# Patient Record
Sex: MALE | Race: WHITE | Employment: OTHER | URBAN - METROPOLITAN AREA
[De-identification: names, ages, dates, MRNs, and addresses within clinical notes are randomized per-mention and may not be internally consistent; named-entity substitution may affect disease eponyms.]

---

## 2022-01-27 ENCOUNTER — OFFICE VISIT (OUTPATIENT)
Dept: ENT CLINIC | Age: 72
End: 2022-01-27
Payer: MEDICARE

## 2022-01-27 ENCOUNTER — PROCEDURE VISIT (OUTPATIENT)
Dept: AUDIOLOGY | Age: 72
End: 2022-01-27
Payer: MEDICARE

## 2022-01-27 VITALS
OXYGEN SATURATION: 94 % | TEMPERATURE: 97.3 F | HEART RATE: 67 BPM | SYSTOLIC BLOOD PRESSURE: 148 MMHG | DIASTOLIC BLOOD PRESSURE: 81 MMHG

## 2022-01-27 DIAGNOSIS — H93.13 SUBJECTIVE TINNITUS OF BOTH EARS: Chronic | ICD-10-CM

## 2022-01-27 DIAGNOSIS — R42 DIZZINESS: Primary | Chronic | ICD-10-CM

## 2022-01-27 DIAGNOSIS — H90.A31 MIXED CONDUCTIVE AND SENSORINEURAL HEARING LOSS OF RIGHT EAR WITH RESTRICTED HEARING OF LEFT EAR: ICD-10-CM

## 2022-01-27 DIAGNOSIS — H90.3 SENSORINEURAL HEARING LOSS (SNHL) OF BOTH EARS: Chronic | ICD-10-CM

## 2022-01-27 DIAGNOSIS — H93.A1 PULSATILE TINNITUS OF RIGHT EAR: Primary | ICD-10-CM

## 2022-01-27 PROCEDURE — 92567 TYMPANOMETRY: CPT | Performed by: AUDIOLOGIST

## 2022-01-27 PROCEDURE — 99203 OFFICE O/P NEW LOW 30 MIN: CPT | Performed by: OTOLARYNGOLOGY

## 2022-01-27 PROCEDURE — 92557 COMPREHENSIVE HEARING TEST: CPT | Performed by: AUDIOLOGIST

## 2022-01-27 ASSESSMENT — ENCOUNTER SYMPTOMS
SORE THROAT: 0
RHINORRHEA: 0
SINUS PAIN: 0

## 2022-01-27 NOTE — PROGRESS NOTES
[facility]       NEW PATIENT VISIT      PCP:  Hima Gonzalez      REFERRED BY:   self      CHIEF COMPLAINT  Chief Complaint   Patient presents with    Dizziness       HISTORY OF PRESENT ILLNESS       Celeste Schwarz is a 66 y.o. male here for evaluation and treatment of dizziness, for one year, and which is intermittent, comes and goes. Patient denied any antecedent illness or ear or head trauma. Patient denied any modifying factors. Associated symptoms include nausea, cold sweat a couple times when really severe spinning. \"When I lay down, my head starts spinning not all the time but most of the time. Comes on with fast movements. \"  Dizziness was described as a sensation of head spinning, out of balance. Dizziness is precipitated \"if I make a sudden movement. Get up too quick. \"  No other precipitating factors have been noted. There is no change in hearing or onset of or change in tinnitus during dizziness. Tinnitus, ringing, about 25 years. REVIEW OF SYSTEMS   Review of Systems   Constitutional: Negative for chills, fever and unexpected weight change. HENT: Positive for tinnitus. Negative for congestion, ear discharge, ear pain, hearing loss, postnasal drip, rhinorrhea, sinus pain and sore throat. PAST MEDICAL HISTORY    Past Medical History:   Diagnosis Date    Atrial fibrillation ([address]     Gout     Hypertension        Past Surgical History:   Procedure Laterality Date    AMPUTATION      left hand little finger partial amputation    CARDIAC SURGERY      valve replacement    CATARACT REMOVAL WITH IMPLANT      bilat    EYE SURGERY           EXAMINATION    Vitals:    01/27/22 0924   BP: (!) 148/81   Site: Left Upper Arm   Position: Sitting   Cuff Size: Large Adult   Pulse: 67   Temp: 97.3 °F (36.3 °C)   TempSrc: Temporal   SpO2: 94%       Physical Exam  Vitals reviewed.    Constitutional:       General: He is awake. He is not in acute distress. Appearance: Normal appearance. He is well-developed. He is not ill-appearing or toxic-appearing. HENT:      Head: Normocephalic and atraumatic. Salivary Glands: Right salivary gland is not diffusely enlarged or tender. Left salivary gland is not diffusely enlarged or tender. Right Ear: Tympanic membrane, ear canal and external ear normal.      Left Ear: Tympanic membrane, ear canal and external ear normal.      Ears:      Comments: Bilateral otomicroscopy performed. Nose: Nose normal. No nasal deformity, septal deviation, mucosal edema, congestion or rhinorrhea. Right Turbinates: Not enlarged. Left Turbinates: Not enlarged. Right Sinus: No maxillary sinus tenderness or frontal sinus tenderness. Left Sinus: No maxillary sinus tenderness or frontal sinus tenderness. Mouth/Throat:      Lips: Pink. No lesions. Mouth: Mucous membranes are moist. No oral lesions. Tongue: No lesions. Palate: No mass and lesions. Pharynx: Oropharynx is clear. Uvula midline. No oropharyngeal exudate or posterior oropharyngeal erythema. Tonsils: No tonsillar exudate or tonsillar abscesses. Neck:      Thyroid: No thyroid mass, thyromegaly or thyroid tenderness. Trachea: No tracheal deviation. Comments: No cervical masses or tenderness. Musculoskeletal:      Cervical back: Normal range of motion and neck supple. Lymphadenopathy:      Cervical: No cervical adenopathy. Neurological:      Mental Status: He is alert. Cranial Nerves: Cranial nerves are intact. No cranial nerve deficit, dysarthria or facial asymmetry. Coordination: Finger-Nose-Finger Test normal. Rapid alternating movements normal.           AUDIOGRAM         IMPRESSION / DIAGNOSES / April Ivy was seen today for dizziness.     Diagnoses and all orders for this visit:    [facility], Angela Rodriguez    Subjective tinnitus of both ears    Sensorineural hearing loss (SNHL) of both ears         RECOMMENDATIONS/PLAN      Return for recheck after VNG/ENG testing.

## 2022-03-04 ENCOUNTER — PROCEDURE VISIT (OUTPATIENT)
Dept: AUDIOLOGY | Age: 72
End: 2022-03-04
Payer: MEDICARE

## 2022-03-04 DIAGNOSIS — R42 DIZZINESS: Primary | ICD-10-CM

## 2022-03-04 PROCEDURE — 92537 CALORIC VSTBLR TEST W/REC: CPT | Performed by: AUDIOLOGIST

## 2022-03-04 PROCEDURE — 92540 BASIC VESTIBULAR EVALUATION: CPT | Performed by: AUDIOLOGIST

## 2022-03-30 ENCOUNTER — OFFICE VISIT (OUTPATIENT)
Dept: ENT CLINIC | Age: 72
End: 2022-03-30
Payer: MEDICARE

## 2022-03-30 VITALS
HEART RATE: 71 BPM | TEMPERATURE: 97.1 F | DIASTOLIC BLOOD PRESSURE: 67 MMHG | OXYGEN SATURATION: 93 % | SYSTOLIC BLOOD PRESSURE: 127 MMHG

## 2022-03-30 DIAGNOSIS — R42 DIZZINESS: Primary | Chronic | ICD-10-CM

## 2022-03-30 DIAGNOSIS — R94.113 ABNORMAL ENG (ELECTRONYSTAGMOGRAM): ICD-10-CM

## 2022-03-30 DIAGNOSIS — H93.13 SUBJECTIVE TINNITUS OF BOTH EARS: Chronic | ICD-10-CM

## 2022-03-30 DIAGNOSIS — H90.3 SENSORINEURAL HEARING LOSS (SNHL) OF BOTH EARS: Chronic | ICD-10-CM

## 2022-03-30 PROCEDURE — 99213 OFFICE O/P EST LOW 20 MIN: CPT | Performed by: OTOLARYNGOLOGY

## 2022-03-30 NOTE — PATIENT INSTRUCTIONS
· I recommend an MRI scan of the IACs/brain, with contrast, to rule out a vestibular schwannoma (\"acoustic neuroma\") or central nervous system disease as the reason for your dizziness, abnormal ENG or VNG, unilateral or asymmetric sensorineural hearing loss, or tinnitus. There are adverse consequences of untreated acoustic neuroma, i.e. total loss of hearing, paralysis of the face, permanent dizziness or pressure on the brain. This may occur suddenly due to bleeding into the tumor or sudden growth. Call the office for the results of the MRI scan 10 days after the test, if you have not been informed previously. · Avoid working or being at heights, on ladders or scaffolding or near the edges of platforms or using heavy or complicated machinery or operating a motorized vehicle if you are experiencing dizziness and until having been dizzy free for 72 hours. Even then, take appropriate care and precautions as dizziness could occur at any time. · Avoid any motions or activity that results in the off balance sensation. Stand up or arise slowly and in stages, as we discussed. · You should consider amplification therapy, hearing aid, if you are having difficulty communicating and/or hearing important sounds. · You should return for an annual hearing test to monitor your hearing, and whenever your hearing further decreases significantly. · You should return if your ears plug up with ear wax causing difficulty hearing or pressure. · You should employ the tinnitus masking techniques and strategies we discussed, as needed. Bedside tinnitus masking devices (eg. a white noise machine, noise machine, noise jeremy on your cell phone, fan on in the room, radio with light music or tuned between stations to white noise static) can be very helpful.   · You should avoid exposure to excessively high levels of noise/sound and use hearing protection measures we discussed, as needed, if such exposure is unavoidable. · Proceed with vestibular physical therapy. · NO Q-TIPS IN THE EARS  You should never clean your ears with a Q-tip, cotton tipped applicator, Leonila pin, paper clip, pen cap, nail file, or any other instrument. I recommend only use of one the several ear wax removal kits available \"over the counter\" if you feel a need to try to remove ear wax. No other methods should be self used for this purpose as there is danger of injury to the ear and risk of irreparable and irreversible permanent hearing loss.

## 2022-03-30 NOTE — PROGRESS NOTES
[facility]       CHIEF COMPLAINT  Chief Complaint   Patient presents with    Dizziness       HISTORY OF PRESENT ILLNESS       Nori Ding is a 66 y.o. male here for recheck and follow up of dizziness. Gets a mild headache after episodes of dizziness. VNG  (see full report for details)     I independently assessed and evaluated the results of the audiogram, a test performed by another healthcare provider. This shows findings consistent with central nervous system etiology of vestibular dysfunction and consistent with benign paroxysmal positional vertigo. IMPRESSIONS:       Abnormal VNG.   All central subtests were abnormal, suggestive of central findings. Should be noted, patient does have 20/40 vision of which could be a contributor to abnormal central findings. Positional testing revealed low velocity left-beating nystagmus and down-beating nystagmus for body right position only. There was evidence of left beating nystagmus during first trial and up-beating nystagmus during both trials of right [employer]. This finding of up-beating nystagmus is clinically significant. Caloric irrigations were normal for unilateral weakness . Of the four irrigations, left beating nystagmus was 24% stronger than right beating nystagmus. This value for Directional Preponderance demonstrates borderline clinical significance. COUNSELING    The audiogram results were reviewed again and discussed with the patient, whom I advised of the type and severity of hearing loss, the probable etiology, possible associated impairment and disability, need for noise protection and avoidance, possible benefits of hearing aids, or other amplification therapy. I discussed the etiology of tinnitus and treatment/coping measures including masking techniques, and need for annual and prn hearing tests.   Patient was advised of difficulty understanding speech in the presence of moderate to high volume background noise. The VNG  results were reviewed and discussed with the patient, whom I advised of the significance of the findings. I discussed the functions of the vestibular system and the probable etiology and diagnosis, vestibular dysfunction and impairment and possible disability. I advised that I cannot rule out intermittent vestibular dysfunction. I discussed vestibular exercises, and possible benefits of vestibular rehabilitation therapy. I discussed activity and safety precautions, fall prevention and avoidance, and need for continued follow-up. I discussed possible etiologies of dizziness including, but not limited to: Meniere's disease, vestibular neuronitis, acute labyrinthitis, idiopathic peripheral vestibular dysfunction, benign paroxysmal positional vertigo, autoimmune hearing loss and vestibular dysfunction, multiple sclerosis, vestibular schwannoma (acoustic neuroma), CNS neoplasm, and other CNS disease. I discussed vestibular schwannoma (acoustic neuroma), signs, symptoms, possible progression with loss of hearing, dizziness, facial nerve paralysis and compression of brain stem, and thus the need for an MRI scan of the IACs/brain to investigate for this entity. Shalom Mathews / Eliz Saunders / Florentin Ramires / Patrick Chun was seen today for dizziness.     Diagnoses and all orders for this visit:    Dizziness  Comments:  probable BPPV, possible central etiology, possible contribution due to vision problem  Orders:  -     MRI IAC [facility] [address] [facility]    Abnormal ENG (electronystagmogram)  -     MRI IAC [facility]ER[facility] WO CONTRAST    Subjective tinnitus of both ears    Sensorineural hearing loss (SNHL) of both ears           RECOMMENDATIONS / PLAN     Patient Instructions     · I recommend an MRI scan of the IACs/brain, with contrast, to rule out a vestibular schwannoma (\"acoustic neuroma\") or central nervous system disease as the reason for your dizziness, abnormal ENG or VNG, unilateral or asymmetric sensorineural hearing loss, or tinnitus. There are adverse consequences of untreated acoustic neuroma, i.e. total loss of hearing, paralysis of the face, permanent dizziness or pressure on the brain. This may occur suddenly due to bleeding into the tumor or sudden growth. Call the office for the results of the MRI scan 10 days after the test, if you have not been informed previously. · Avoid working or being at heights, on ladders or scaffolding or near the edges of platforms or using heavy or complicated machinery or operating a motorized vehicle if you are experiencing dizziness and until having been dizzy free for 72 hours. Even then, take appropriate care and precautions as dizziness could occur at any time. · Avoid any motions or activity that results in the off balance sensation. Stand up or arise slowly and in stages, as we discussed. · You should consider amplification therapy, hearing aid, if you are having difficulty communicating and/or hearing important sounds. · You should return for an annual hearing test to monitor your hearing, and whenever your hearing further decreases significantly. · You should return if your ears plug up with ear wax causing difficulty hearing or pressure. · You should employ the tinnitus masking techniques and strategies we discussed, as needed. Bedside tinnitus masking devices (eg. a white noise machine, noise machine, noise jeremy on your cell phone, fan on in the room, radio with light music or tuned between stations to white noise static) can be very helpful. · You should avoid exposure to excessively high levels of noise/sound and use hearing protection measures we discussed, as needed, if such exposure is unavoidable. · Proceed with vestibular physical therapy.   · NO Q-TIPS IN THE EARS  You should

## 2022-04-11 ENCOUNTER — HOSPITAL ENCOUNTER (OUTPATIENT)
Dept: MRI IMAGING | Age: 72
Discharge: HOME | End: 2022-04-11
Payer: MEDICARE

## 2022-04-11 DIAGNOSIS — R42 DIZZINESS: Chronic | ICD-10-CM

## 2022-04-11 DIAGNOSIS — R94.113 ABNORMAL ENG (ELECTRONYSTAGMOGRAM): ICD-10-CM

## 2022-04-11 LAB
CREAT SERPL-MCNC: 1.7 MG/DL (ref 0.8–1.3)
GFR AFRICAN AMERICAN: 48
GFR NON-AFRICAN AMERICAN: 40

## 2022-04-11 PROCEDURE — 70553 MRI BRAIN STEM W/O & W/DYE: CPT

## 2022-04-11 PROCEDURE — A9577 INJ MULTIHANCE: HCPCS | Performed by: OTOLARYNGOLOGY

## 2022-04-11 PROCEDURE — 82565 ASSAY OF CREATININE: CPT

## 2022-04-11 PROCEDURE — 36415 COLL VENOUS BLD VENIPUNCTURE: CPT

## 2022-04-11 PROCEDURE — 6360000004 HC RX CONTRAST MEDICATION: Performed by: OTOLARYNGOLOGY

## 2022-04-11 PROCEDURE — 2580000003 HC RX 258: Performed by: OTOLARYNGOLOGY

## 2022-04-11 RX ORDER — SODIUM CHLORIDE 0.9 % (FLUSH) 0.9 %
10 SYRINGE (ML) INJECTION ONCE
Status: COMPLETED | OUTPATIENT
Start: 2022-04-11 | End: 2022-04-11

## 2022-04-11 RX ADMIN — Medication 10 ML: at 11:16

## 2022-04-11 RX ADMIN — GADOBENATE DIMEGLUMINE 16 ML: 529 INJECTION, SOLUTION INTRAVENOUS at 11:15

## 2022-04-21 ENCOUNTER — HOSPITAL ENCOUNTER (OUTPATIENT)
Dept: PHYSICAL THERAPY | Age: 72
Setting detail: THERAPIES SERIES
Discharge: HOME | End: 2022-04-21
Payer: MEDICARE

## 2022-04-21 PROCEDURE — 97162 PT EVAL MOD COMPLEX 30 MIN: CPT

## 2022-04-21 NOTE — PLAN OF CARE
[id number]  [address] [facility], [address]  Phone: [phone]   Fax: [phone]     Physical Therapy Certification    Dear Referring Provider (secondary): Jewel Galvan MD,    We had the pleasure of evaluating the following patient for physical therapy services at [address] [place] [address]. A summary of our findings can be found in the initial assessment below. This includes our plan of care. If you have any questions or concerns regarding these findings, please do not hesitate to contact me at the office phone number checked above. Thank you for the referral.       Physician Signature:_______________________________Date:__________________  By signing above (or electronic signature), therapist's plan is approved by physician      Patient: Sol Telles   : 1950   MRN: [id number]  Referring Physician:  Jewel Galvan MD       Evaluation Date: 2022      Medical Diagnosis Information:  Diagnosis: R42 (ICD-10-CM) - Dizziness   Treatment Diagnosis: BPPV, righ ear unilateral vestibular hypofunction, & imbalance                                         Insurance information: PT Insurance Information: [employer] essential [employer]MO     Precautions/ Contra-indications:   Latex Allergy:  [x]NO      []YES  Preferred Language for Healthcare:   [x]English       []Other:    C-SSRS Triggered by Intake questionnaire (Past 2 wk assessment ):   [x] No, Questionnaire did not trigger screening.   [] Yes, Patient intake triggered C-SSRS Screening     [] Completed, no further action required. [] Completed, PCP notified via Epic    SUBJECTIVE: Patient stated chief complaint:  Patient reports that he has been having dizziness/ spinning for about a year when lying down and getting out of bed. States at times he had fogginess with rapid head movement. Comments also that he has trouble with his vision trying track moving objects.  His goal is to Numbness/Tingling:  none    Gait: (include devices/WB status)  Wide base gait pattern , slow albert     Cervical AROM    Cervical Flexion WFL all  No symptoms    Cervical Extension     Cervical SB L R    Cervical rotation L R    Upper Extremity PROM AROM         L R L R   Shoulder Flexion  WFL all WFL all WFLall WFL all   Shoulder Abduction        Shoulder External Rotation        Shoulder Internal Rotation        Elbow Flexion        Elbow Extension        Oculomotor/Vestibular Examination & Special Tests:     Coordination:  Rapid alternating movements: [] Normal [] Dysdiadochokinesia   Finger to Nose:   [] Normal [] Dysmetric  Heel to Shin:    [x] Normal [] Ataxic    Balance & Postural Control Tests:  Clinical Test of Sensory Interaction for Balance (CTSIB) performed in Romberg stance  CONDITION TIME STRATEGY SWAY    Eyes open, firm surface 15 None None     Eyes closed, firm surface 15 Ankle  Lateral     Eyes open, foam surface       Eyes closed, foam surface        Balance:  CONDITION TIME STRATEGY SWAY   Narrowed CORIN 15 None  None    Tandem R 15 Ankle  Right lateral sway    Tandem L 15 Ankle  Lateral    SLS L 3 Step  Lateral - multiple attempts    SLS R 10 Step  Lateral      Oculomotor/Vestibular Examination     Spontaneous nystagmus:  [] Left  [] Right [x] Absent    Gaze-Evoked nystagmus with fixation present:   Primary [] Present [x] Absent   Right  [] Present [x] Absent   Left  [] Present [x] Absent    Smooth Pursuit (H):  [x] Normal [] Abnormal Comments:    Smooth Pursuit (V):  [x] Normal [] Abnormal Comments:     Saccades (H):  [x] Normal [] Abnormal Comments:  Slow albert     Saccades (V):  [x] Normal [] Abnormal Comments: slow albert     Convergence:  [x] Normal [] Abnormal Comments:     Head Thrust Test:  [] Normal [x] Abnormal  Comments:  Hypofunction to left     VOR Cancellation (H): [x] Normal [] Abnormal Comments:    VOR Cancellation (V): [x] Normal [] Abnormal Comments:     VOR (V):   [x] Normal [] Abnormal Comments: not as bad     VOR (H):   [x] Normal [] Abnormal Comments: fogginess, \"off feeling\"         Positional Testing:  R [place] maneuver:    Nystagmus:  [x] Yes  [] No  [] Duration:2-3 seconds        [x] Direction: upbeat torsion     Vertigo:  [] Yes  [] No  [] Duration:     L [place] maneuver: NT   Nystagmus:  [] Yes  [] No  [] Duration:       [] Direction:    Vertigo:  [] Yes  [] No  [] Duration:     Supine roll head right: NT  To be test later    Nystagmus:  [] Yes  [] No  [] Duration:       [] Direction:    Vertigo:  [] Yes  [] No  [] Duration:     Supine roll head left: NT   Nystagmus:  [] Yes  [] No  [] Duration:       [] Direction:    Vertigo:  [] Yes  [] No  [] Duration:       [x] Patient history, allergies, meds reviewed. Medical chart reviewed. See intake form. Review of Systems (ROS):  [x]Performed Review of systems (Integumentary, Cardiopulmonary, Neurological) by intake and observation. Intake form has been scanned into medical record. Patient has been instructed to contact their primary care physician regarding ROS issues if not already being addressed at this time.       Co-morbidities/Complexities (which will affect course of rehabilitation):   []None           Arthritic conditions   []Rheumatoid arthritis (M05.9)  []Osteoarthritis (M19.91)   Cardiovascular conditions   [x]Hypertension (I10)  []Hyperlipidemia (E78.5)  []Angina pectoris (I20)  []Atherosclerosis (I70)   Musculoskeletal conditions   []Disc pathology   []Congenital spine pathologies   []Prior surgical intervention  []Osteoporosis (M81.8)  []Osteopenia (M85.8)   Endocrine conditions   []Hypothyroid (E03.9)  []Hyperthyroid Gastrointestinal conditions   []Constipation (V28.07)   Metabolic conditions   []Morbid obesity (E66.01)  []Diabetes type 1(E10.65) or 2 (E11.65)   []Neuropathy (G60.9)     Pulmonary conditions   []Asthma (J45)  []Coughing   []COPD (J44.9)   Psychological Disorders  []Anxiety (F41.9)  []Depression (F32.9)   []Other:   [x]Other:   Cardiac valve replacement         Barriers to/and or personal factors that will affect rehab potential:              []Age  []Sex    []Smoker              []Motivation/Lack of Motivation                        [x]Co-Morbidities              []Cognitive Function, education/learning barriers              []Environmental, home barriers              []profession/work barriers  []past PT/medical experience  []other:  Justification:HTN, A-fib    Falls Risk Assessment (30 days):   [x] Falls Risk assessed; no intervention required. [] Falls Risk assessed; Patient requires intervention due to being higher risk   TUG score (>12s at risk):     [] Falls education provided, including       ASSESSMENT: The patient is a 67 yo male who presents with right ear canalith with upbeat torsion nystagmus & dizziness. He demonstrated Imbalance with narrow base support . He has had  Persistent that has gone on for up to year with lying down and getting up. The patient to benefit from skilled PT services to address vestibular hypofunction and restore normal canalith for the optimal strength of his vestibular system.      Functional Impairments:  Problem List/Functional Limitations:   [x] BPPV    [x] Right [x] Posterior Canal [x] Canalithiasis    [] Left  [] Horizontal Canal [] Cupulolithiasis   [] Decreased Gaze Stabilization    [x] Increased Motion Sensitivity   [x] Unilateral Vestibular Hypofunction [] Bilateral Vestibular Hypofunction   [] Gait Instability    [] Decreased tolerance for ADLs    [] Decreased functional strength   [x] Reduced Balance/Proprioceptive control   [] Reduced ability to hear/focus   [] Noted cervical/thoracic/GHJ joint hypomobility   [] Noted cervical/thoracic/GHJ joint hypermobility   [] Decreased cervical/UE functional ROM   [] Noted Headache pain aggravated by neck movements with/without dizziness   [] Abnormal reflexes/sensation/myotomal/dermatomal deficits   [] Decreased DCF control or ability to hold head up   [] Decreased RC/scapular/core strength and neuromuscular control     Functional Activity Limitations (from functional questionnaire and intake)   []Reduced ability to tolerate prolonged functional positions   []Reduced ability or difficulty with changes of positions or transfers between positions  []Reduced ability to transfer in/out of bed or rolling in bed   []Reduced ability or tolerance with driving, reading and/or computer work   []Reduced ability to perform lifting, reaching, carrying tasks   []Reduced ability to forward bend   [x]Reduced ability to ambulate prolonged functional periods/distances/surfaces   []Reduced ability to ascend/descend stairs  []Reduced ability to concentrate/focus  [x]Reduced ability to turn/pitch head rapidly  []Reduced ability to self-correct for losses of balance []other:       Participation Restrictions   [x]Reduced participation in self-care activities   []Reduced participation in home management activities   []Reduced participation in work activities   []Reduced participation in social activities   []Reduced participation in sport/recreational activities    Classification:   [x]Signs/symptoms consistent with BPPV (benign paroxysmal positional vertigo)      []Signs/symptoms consistent with unilateral peripheral vestibulopathy (i.e., vestibular neuritis, labyrinthitis, acoustic neuroma)   [x]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  []Signs/symptoms consistent with migraine-related vestibulopathy  []Signs/symptoms consistent with Meniere's disease / post-traumatic hydrops  []Signs/symptoms consistent with perilymphatic fistula   []Signs/symptoms consistent with cervicogenic dizziness  []Signs/symptoms consistent with gait instability   []Signs/symptoms consistent with motion sensitivity    []signs/symptoms consistent with neck pain with mobility deficits     []signs/symptoms consistent with neck pain with movement coordinated impairments    []signs/symptoms consistent with neck pain with radiating pain    []signs/symptoms consistent with neck pain with headaches (cervicogenic)    []Signs/symptoms consistent with nerve root involvement including myotome & dermatome dysfunction   []sign/symptoms consistent with facet dysfunction of cervical and thoracic spine    []signs/symptoms consistent suggesting central cord compression/UMN syndromes   []signs/symptoms consistent with discogenic cervical pain   []signs/symptoms consistent with rib dysfunction   []signs/symptoms consistent with postural dysfunction   []signs/symptoms consistent with shoulder pathology    []signs/symptoms consistent with post-surgical status including decreased ROM, strength and function.    []signs/symptoms consistent with pathology which may benefit from Dry Needling  []signs/symptoms which may limit the use of advanced manual therapy techniques: (Elevated CV risk profile, recent trauma, intolerance to end range positions, prior TIA, visual issues, UE neurological compromise)   []other:      Prognosis/Rehab Potential:      [x]Excellent   []Good    []Fair   []Poor    Tolerance of evaluation/treatment:    [x]Excellent   []Good    []Fair   []Poor     Physical Therapy Evaluation Complexity Justification  [x] A history of present problem with:  [] no personal factors and/or comorbidities that impact the plan of care;  [x]1-2 personal factors and/or comorbidities that impact the plan of care  []3 personal factors and/or comorbidities that impact the plan of care  [x] An examination of body systems using standardized tests and measures addressing any of the following: body structures and functions (impairments), activity limitations, and/or participation restrictions;:  [x] a total of 1-2 or more elements   [] a total of 3 or more elements   [] a total of 4 or more elements   [x] A clinical presentation with:  [x] stable and/or uncomplicated characteristics   [] evolving clinical presentation with changing characteristics  [] unstable and unpredictable characteristics;   [x] Clinical decision making of [] low , [x] moderate, [] high complexity using standardized patient assessment instrument and/or measurable assessment of functional outcome. [] EVAL (LOW) 70876 (typically 15 minutes face-to-face)  [x] EVAL (MOD) 69164 (typically 30 minutes face-to-face)  [] EVAL (HIGH) 84147 (typically 45 minutes face-to-face)  [] RE-EVAL     PLAN:   Today's Treatment:    [x] See flowsheet   [x] Patient treated with canalith repositioning maneuver   [x] Education materials provided on BPPV/Vestibular Dysfunction/Habituation   [] Precautions provided and patient to follow precautions for next 24 hours in regards to BPPV management   [x] Written home exercise instructions   [] Other:    Frequency/Duration:  2 days per week for 4 Weeks:  Interventions:  [x]  Therapeutic exercise including: strength training, ROM, for LEs, cervical spine & UEs  [x]  NMR activation and proprioception for BLEs, vestibular training/habituation, balance, coordination  [x]  Manual therapy as indicated via: canalith repositioning maneuvers, Dry Needling/IASTM, STM, PROM, Gr I-IV mobilizations, manipulation. [x]  Modalities as needed that may include: thermal agents, E-stim, Biofeedback, US, iontophoresis as indicated  [x]  Gait training  [x]  Patient education on BPPV/vestibular function, balance, postural re-education, activity modification, progression of HEP. HEP instruction: Written HEP instructions provided and reviewed. GOALS:  Patient stated goal: to improve dizziness   [] Progressing: [] Met: [] Not Met: [] Adjusted    Therapist goals for Patient:   Short Term Goals: To be achieved in: 2 weeks  1. Independent in HEP and progression per patient tolerance, in order to prevent re-injury. [] Progressing: [] Met: [] Not Met: [] Adjusted  2.  Patient will have a decrease in dizziness/imbalance/symptoms with normal ADLs to facilitate improvement in movement, function, balance and ADLs as indicated by Functional Deficits. [] Progressing: [] Met: [] Not Met: [] Adjusted    Long Term Goals: To be achieved in: 4 weeks  1. FOTO score of 60  to assist with reaching prior level of function. [] Progressing: [] Met: [] Not Met: [] Adjusted    2. Patient will demonstrate negative oculomotor special testing/positional testing as indicated by patients Functional Deficits. [] Progressing: [] Met: [] Not Met: [] Adjusted  3. Patient will return to functional activities including lying down in bed without increased symptoms or restriction. [] Progressing: [] Met: [] Not Met: [] Adjusted  4. Patient to be able to make rapid head movements without dizziness/apprehension.    [] Progressing: [] Met: [] Not Met: [] Adjusted     Electronically signed by:  Akin Francois PT

## 2022-04-21 NOTE — FLOWSHEET NOTE
[facility]  Phone: [phone]   Fax: [phone]    Physical Therapy Daily Treatment Note  Date:  2022    Patient Name:  Iesha Taveras    :  1950  MRN: [id number]  Medical/Treatment Diagnosis Information:  · Diagnosis: R42 (ICD-10-CM) - Dizziness  · Treatment Diagnosis: BPPV, righ ear unilateral vestibular hypofunction, & imbalance  Insurance/Certification information:  PT Insurance Information: [place] mediblue essential HMO  Physician Information:  Mitchell Dutton MD   Plan of care signed (Y/N): []  Yes [x]  No     Date of Patient follow up with Physician:  TBPONCHO     Progress Report: []  Yes  [x]  No     Date Range for reporting period:  Beginning:   Ending:     Progress report due (10 Rx/or 30 days whichever is less): visit #27 or   Recertification due (POC duration/ or 90 days whichever is less): visit #20    Visit # Insurance Allowable Auth required?  Date Range   eval Waiting approval  [x]  Yes  []  No Waiting approval       Units approved Units used Date Range            Latex Allergy:  [x]NO      []YES  Preferred Language for Healthcare:   [x]English       []other:    Functional Scale:           Date assessed:  FOTO physical FS primary measure score = 56; risk adjusted = 53  2022    Pain level:  0/10     SUBJECTIVE:  See eval    OBJECTIVE: See eval      RESTRICTIONS/PRECAUTIONS:   Cardiac valve replacement, HTN    Exercises/Interventions:     Therapeutic Exercises (89502) Resistance / level Sets/sec Reps Notes                                                                  Therapeutic Activities (54028)                                          Neuromuscular Re-ed (06753)       silver roberts (+) R  R CRT performed x 2      upbeat torsional nystagmus 2 sec x1 CRT, 2 CRT no nystagmus    VOR        Saccade   Smooth Pursuit       Turns/twist       Quarter turns               Manual Intervention ([address] [facility]) Modalities:     Pt. Education:  -patient educated on diagnosis, prognosis and expectations for rehab  -all patient questions were answered    Home Exercise Program:  4/21/2022: VOR handout [facility]       Therapeutic Exercise and NMR EXR  [] (05130) Provided verbal/tactile cueing for activities related to strengthening, flexibility, endurance, ROM for improvements in  [] LE / Lumbar: LE, proximal hip, and core control with self care, mobility, lifting, ambulation. [] UE / Cervical: cervical, postural, scapular, scapulothoracic and UE control with self care, reaching, carrying, lifting, house/yardwork, driving, computer work.  [] (74220) Provided verbal/tactile cueing for activities related to improving balance, coordination, kinesthetic sense, posture, motor skill, proprioception to assist with   [] LE / lumbar: LE, proximal hip, and core control in self care, mobility, lifting, ambulation and eccentric single leg control. [] UE / cervical: cervical, scapular, scapulothoracic and UE control with self care, reaching, carrying, lifting, house/yardwork, driving, computer work.   [] (42793) Therapist is in constant attendance of 2 or more patients providing skilled therapy interventions, but not providing any significant amount of measurable one-on-one time to either patient, for improvements in  [] LE / lumbar: LE, proximal hip, and core control in self care, mobility, lifting, ambulation and eccentric single leg control. [] UE / cervical: cervical, scapular, scapulothoracic and UE control with self care, reaching, carrying, lifting, house/yardwork, driving, computer work.      NMR and Therapeutic Activities:    [x] (72894 or 35921) Provided verbal/tactile cueing for activities related to improving balance, coordination, kinesthetic sense, posture, motor skill, proprioception and motor activation to allow for proper function of   [] LE: / Lumbar core, proximal hip and LE with self care and ADLs  [] UE / Cervical: cervical, postural, scapular, scapulothoracic and UE control with self care, carrying, lifting, driving, computer work.   [] (33859) Gait Re-education- Provided training and instruction to the patient for proper LE, core and proximal hip recruitment and positioning and eccentric body weight control with ambulation re-education including up and down stairs     Home Management Training / Self Care:  [] (85117) Provided self-care/home management training related to activities of daily living and compensatory training, and/or use of adaptive equipment for improvement with: ADLs and compensatory training, meal preparation, safety procedures and instruction in use of adaptive equipment, including bathing, grooming, dressing, personal hygiene, basic household cleaning and chores.      Home Exercise Program:    [x] (31462) Reviewed/Progressed HEP activities related to strengthening, flexibility, endurance, ROM of   [] LE / Lumbar: core, proximal hip and LE for functional self-care, mobility, lifting and ambulation/stair navigation   [] UE / Cervical: cervical, postural, scapular, scapulothoracic and UE control with self care, reaching, carrying, lifting, house/yardwork, driving, computer work  [] (96599)Reviewed/Progressed HEP activities related to improving balance, coordination, kinesthetic sense, posture, motor skill, proprioception of   [] LE: core, proximal hip and LE for self care, mobility, lifting, and ambulation/stair navigation    [] UE / Cervical: cervical, postural,  scapular, scapulothoracic and UE control with self care, reaching, carrying, lifting, house/yardwork, driving, computer work    Manual Treatments:  PROM / STM / Oscillations-Mobs:  G-I, II, III, IV (PA's, Inf., Post.)  [] (41652) Provided manual therapy to mobilize LE, proximal hip and/or LS spine soft tissue/joints for the purpose of modulating pain, promoting relaxation,  increasing ROM, reducing/eliminating soft tissue swelling/inflammation/restriction, improving soft tissue extensibility and allowing for proper ROM for normal function with   [] LE / lumbar: self care, mobility, lifting and ambulation. [] UE / Cervical: self care, reaching, carrying, lifting, house/yardwork, driving, computer work. Modalities:  [] (07386) Vasopneumatic compression: Utilized vasopneumatic compression to decrease edema / swelling for the purpose of improving mobility and quad tone / recruitment which will allow for increased overall function including but not limited to self-care, transfers, ambulation, and ascending / descending stairs. Charges:  Timed Code Treatment Minutes: 27   Total Treatment Minutes: 40     [] EVAL - LOW (45754)   [x] EVAL - MOD (51832)  [] EVAL - HIGH (64583)  [] RE-EVAL (67608)  [] [place](04564) x       [] Ionto  [x] NMR (10196) x       [] Vaso  [] Manual (10361) x       [] Ultrasound  [] TA x        [] Mech Traction (67607)  [] Aquatic Therapy x     [] ES (un) (47000):   [] Home Management Training x  [] ES(attended) (79394)   [] Dry Needling 1-2 muscles (49765):  [] Dry Needling 3+ muscles (401500)  [] Group:      [] Other:     GOALS:  Patient stated goal: to improve dizziness   []? Progressing: []? Met: []? Not Met: []? Adjusted     Therapist goals for Patient:   Short Term Goals: To be achieved in: 2 weeks  1. Independent in HEP and progression per patient tolerance, in order to prevent re-injury. []? Progressing: []? Met: []? Not Met: []? Adjusted  2. Patient will have a decrease in dizziness/imbalance/symptoms with normal ADLs to facilitate improvement in movement, function, balance and ADLs as indicated by Functional Deficits. []? Progressing: []? Met: []? Not Met: []? Adjusted     Long Term Goals: To be achieved in: 4 weeks  1. FOTO score of 60  to assist with reaching prior level of function. []? Progressing: []? Met: []? Not Met: []? Adjusted     2.  Patient will demonstrate negative oculomotor special testing/positional testing as indicated by patients Functional Deficits. []? Progressing: []? Met: []? Not Met: []? Adjusted  3. Patient will return to functional activities including lying down in bed without increased symptoms or restriction. []? Progressing: []? Met: []? Not Met: []? Adjusted  4. Patient to be able to make rapid head movements without dizziness/apprehension. []? Progressing: []? Met: []? Not Met: []? Adjusted       Overall Progression Towards Functional goals/ Treatment Progress Update:  [] Patient is progressing as expected towards functional goals listed. [] Progression is slowed due to complexities/Impairments listed. [] Progression has been slowed due to co-morbidities. [x] Plan just implemented, too soon to assess goals progression <30days   [] Goals require adjustment due to lack of progress  [] Patient is not progressing as expected and requires additional follow up with physician  [] Other    Persisting Functional Limitations/Impairments:  []Sleeping []Sitting               []Standing []Transfers        []Walking []Kneeling               []Stairs []Squatting / bending   []ADLs []Reaching  []Lifting  []Housework  []Driving []Job related tasks  []Sports/Recreation [x]Other: lying down / getting up         ASSESSMENT:  See eval  Treatment/Activity Tolerance:  [] Patient able to complete tx [] Patient limited by fatigue  [] Patient limited by pain  [] Patient limited by other medical complications  [] Other:     Prognosis: [] Good [] Fair  [] Poor    Patient Requires Follow-up: [x] Yes  [] No    Plan for next treatment session:    PLAN: See eval. PT 2x / week for 4 weeks.    [] Continue per plan of care [] Alter current plan (see comments)  [x] Plan of care initiated [] Hold pending MD visit [] Discharge    Electronically signed by: Michelle Barnes, PT PT, DPT    Note: If patient does not return for scheduled/ recommended follow up visits, this note will serve as a discharge from care along with most recent update on progress.

## 2022-05-11 ENCOUNTER — APPOINTMENT (OUTPATIENT)
Dept: PHYSICAL THERAPY | Age: 72
End: 2022-05-11
Payer: MEDICARE

## 2022-05-12 ENCOUNTER — HOSPITAL ENCOUNTER (OUTPATIENT)
Dept: PHYSICAL THERAPY | Age: 72
Setting detail: THERAPIES SERIES
Discharge: HOME | End: 2022-05-12
Payer: MEDICARE

## 2022-05-12 PROCEDURE — 97112 NEUROMUSCULAR REEDUCATION: CPT

## 2022-05-12 PROCEDURE — 95992 CANALITH REPOSITIONING PROC: CPT

## 2022-05-12 NOTE — FLOWSHEET NOTE
[address] [facility]  Phone: [phone]   Fax: [phone]    Physical Therapy Daily Treatment Note  Date:  2022    Patient Name:  Sol Telles    :  1950  MRN: [id number]  Medical/Treatment Diagnosis Information:  · Diagnosis: R42 (ICD-10-CM) - Dizziness  · Treatment Diagnosis: BPPV, righ ear unilateral vestibular hypofunction, & imbalance  Insurance/Certification information:  PT Insurance Information: [place] mediblue essential HMO  Physician Information:  Jewel Galvan MD   Plan of care signed (Y/N): []  Yes [x]  No     Date of Patient follow up with Physician:  D[place]     Progress Report: []  Yes  [x]  No     Date Range for reporting period:  Beginning:   Ending:     Progress report due (10 Rx/or 30 days whichever is less): visit #15 or   Recertification due (POC duration/ or 90 days whichever is less): visit #20    Visit # Insurance Allowable Auth required? Date Range   eval   6 visits  [x]  Yes  []  No -2022         Latex Allergy:  [x]NO      []YES  Preferred Language for Healthcare:   [x]English       []other:    Functional Scale:           Date assessed:  TO physical FS primary measure score = 56; risk adjusted = 53  2022    Pain level:  0/10     SUBJECTIVE:  Patient reports that his dizziness has improved some since last visit. States that he will get scheduled stent placement sx for artery blockage which could be contributing some to the dizziness.      OBJECTIVE:       RESTRICTIONS/PRECAUTIONS:   Cardiac valve replacement, HTN    Exercises/Interventions:     Therapeutic Exercises (90832) Resistance / level Sets/sec Reps Notes                                                                  Therapeutic Activities (76759)                                          Neuromuscular Re-ed (45850)       :   Loaded silver batres pike (+) R  R CRT performed x 2      upbeat torsion 7s 1st time, no nystagmus 2 CRT    upbeat torsional nystagmus 2 sec x1 CRT, 2 CRT no nystagmus    VOR  2ft & 6ft standing   1 min   no dizziness    Saccade  V/H  Smooth Pursuit   V/H   10  10    Turns/twist   10    Quarter turns    2 R/L            Manual Intervention (65087)                                                     Modalities:     Pt. Education:  -patient educated on diagnosis, prognosis and expectations for rehab  -all patient questions were answered    Home Exercise Program:  4/21/2022: VOR handout [facility]       Therapeutic Exercise and NMR EXR  [] (29834) Provided verbal/tactile cueing for activities related to strengthening, flexibility, endurance, ROM for improvements in  [] LE / Lumbar: LE, proximal hip, and core control with self care, mobility, lifting, ambulation. [] UE / Cervical: cervical, postural, scapular, scapulothoracic and UE control with self care, reaching, carrying, lifting, house/yardwork, driving, computer work.  [] (20930) Provided verbal/tactile cueing for activities related to improving balance, coordination, kinesthetic sense, posture, motor skill, proprioception to assist with   [] LE / lumbar: LE, proximal hip, and core control in self care, mobility, lifting, ambulation and eccentric single leg control. [] UE / cervical: cervical, scapular, scapulothoracic and UE control with self care, reaching, carrying, lifting, house/yardwork, driving, computer work.   [] (08000) Therapist is in constant attendance of 2 or more patients providing skilled therapy interventions, but not providing any significant amount of measurable one-on-one time to either patient, for improvements in  [] LE / lumbar: LE, proximal hip, and core control in self care, mobility, lifting, ambulation and eccentric single leg control. [] UE / cervical: cervical, scapular, scapulothoracic and UE control with self care, reaching, carrying, lifting, house/yardwork, driving, computer work.      NMR and Therapeutic Activities:    [x] (10830 or ) Provided verbal/tactile cueing for activities related to improving balance, coordination, kinesthetic sense, posture, motor skill, proprioception and motor activation to allow for proper function of   [] LE: / Lumbar core, proximal hip and LE with self care and ADLs  [] UE / Cervical: cervical, postural, scapular, scapulothoracic and UE control with self care, carrying, lifting, driving, computer work.   [] (99625) Gait Re-education- Provided training and instruction to the patient for proper LE, core and proximal hip recruitment and positioning and eccentric body weight control with ambulation re-education including up and down stairs     Home Management Training / Self Care:  [] (98916) Provided self-care/home management training related to activities of daily living and compensatory training, and/or use of adaptive equipment for improvement with: ADLs and compensatory training, meal preparation, safety procedures and instruction in use of adaptive equipment, including bathing, grooming, dressing, personal hygiene, basic household cleaning and chores.      Home Exercise Program:    [x] (43047) Reviewed/Progressed HEP activities related to strengthening, flexibility, endurance, ROM of   [] LE / Lumbar: core, proximal hip and LE for functional self-care, mobility, lifting and ambulation/stair navigation   [] UE / Cervical: cervical, postural, scapular, scapulothoracic and UE control with self care, reaching, carrying, lifting, house/yardwork, driving, computer work  [] (51902)Reviewed/Progressed HEP activities related to improving balance, coordination, kinesthetic sense, posture, motor skill, proprioception of   [] LE: core, proximal hip and LE for self care, mobility, lifting, and ambulation/stair navigation    [] UE / Cervical: cervical, postural,  scapular, scapulothoracic and UE control with self care, reaching, carrying, lifting, house/yardwork, driving, computer work    Manual Treatments: PROM / STM / Oscillations-Mobs:  G-I, II, III, IV (PA's, Inf., Post.)  [] (25581) Provided manual therapy to mobilize LE, proximal hip and/or LS spine soft tissue/joints for the purpose of modulating pain, promoting relaxation,  increasing ROM, reducing/eliminating soft tissue swelling/inflammation/restriction, improving soft tissue extensibility and allowing for proper ROM for normal function with   [] LE / lumbar: self care, mobility, lifting and ambulation. [] UE / Cervical: self care, reaching, carrying, lifting, house/yardwork, driving, computer work. Modalities:  [] (66280) Vasopneumatic compression: Utilized vasopneumatic compression to decrease edema / swelling for the purpose of improving mobility and quad tone / recruitment which will allow for increased overall function including but not limited to self-care, transfers, ambulation, and ascending / descending stairs. Charges:  Timed Code Treatment Minutes: 42   Total Treatment Minutes: 42     [] EVAL - LOW (63016)   [] EVAL - MOD (58843)  [] EVAL - HIGH (95984)  [] RE-EVAL (19799)  [] FH(86362) x       [] Ionto  [x] NMR (37528) x  2     [] Vaso  [] Manual (28970) x       [] Ultrasound  [] TA x        [] Mech Traction (98887)  [] Aquatic Therapy x     [] ES (un) (34988):   [] Home Management Training x  [] ES(attended) (33359)   [] Dry Needling 1-2 muscles (23389):  [] Dry Needling 3+ muscles (017579)  [] Group:      [x] Other: CRT procedure x1    GOALS:  Patient stated goal: to improve dizziness   []? Progressing: []? Met: []? Not Met: []? Adjusted     Therapist goals for Patient:   Short Term Goals: To be achieved in: 2 weeks  1. Independent in HEP and progression per patient tolerance, in order to prevent re-injury. []? Progressing: []? Met: []? Not Met: []? Adjusted  2.  Patient will have a decrease in dizziness/imbalance/symptoms with normal ADLs to facilitate improvement in movement, function, balance and ADLs as indicated by Functional Deficits. []? Progressing: []? Met: []? Not Met: []? Adjusted     Long Term Goals: To be achieved in: 4 weeks  1. FOTO score of 60  to assist with reaching prior level of function. []? Progressing: []? Met: []? Not Met: []? Adjusted     2. Patient will demonstrate negative oculomotor special testing/positional testing as indicated by patients Functional Deficits. []? Progressing: []? Met: []? Not Met: []? Adjusted  3. Patient will return to functional activities including lying down in bed without increased symptoms or restriction. []? Progressing: []? Met: []? Not Met: []? Adjusted  4. Patient to be able to make rapid head movements without dizziness/apprehension. []? Progressing: []? Met: []? Not Met: []? Adjusted       Overall Progression Towards Functional goals/ Treatment Progress Update:  [] Patient is progressing as expected towards functional goals listed. [] Progression is slowed due to complexities/Impairments listed. [] Progression has been slowed due to co-morbidities. [x] Plan just implemented, too soon to assess goals progression <30days   [] Goals require adjustment due to lack of progress  [] Patient is not progressing as expected and requires additional follow up with physician  [] Other    Persisting Functional Limitations/Impairments:  []Sleeping []Sitting               []Standing []Transfers        []Walking []Kneeling               []Stairs []Squatting / bending   []ADLs []Reaching  []Lifting  []Housework  []Driving []Job related tasks  []Sports/Recreation [x]Other: lying down / getting up         ASSESSMENT:  Patient today began habituation exercises with only mild dizziness with horizontal head turns. He had difficulty with EC compliant and noncompliant surfaces. He presented with positive silver batres pike R ear canalith x 7sec. Performed R CRT for correction. Patient had a good response to R CRT with less dizziness.  Will continue with habituation and gaze exercises to reduce hypofunction with vestibular system and restore normal balance for ADLs. Treatment/Activity Tolerance:  [] Patient able to complete tx [] Patient limited by fatigue  [] Patient limited by pain  [] Patient limited by other medical complications  [] Other:     Prognosis: [] Good [] Fair  [] Poor    Patient Requires Follow-up: [x] Yes  [] No    Plan for next treatment session:    PLAN: See eval. PT 2x / week for 4 weeks. [] Continue per plan of care [] Alter current plan (see comments)  [x] Plan of care initiated [] Hold pending MD visit [] Discharge    Electronically signed by: Love Garg, PT PT, DPT    Note: If patient does not return for scheduled/ recommended follow up visits, this note will serve as a discharge from care along with most recent update on progress.

## 2022-05-16 ENCOUNTER — HOSPITAL ENCOUNTER (OUTPATIENT)
Dept: PHYSICAL THERAPY | Age: 72
Setting detail: THERAPIES SERIES
Discharge: HOME | End: 2022-05-16
Payer: MEDICARE

## 2022-05-16 PROCEDURE — 95992 CANALITH REPOSITIONING PROC: CPT

## 2022-05-16 PROCEDURE — 97112 NEUROMUSCULAR REEDUCATION: CPT

## 2022-05-16 NOTE — FLOWSHEET NOTE
[address] [facility]  Phone: [phone]   Fax: [phone]    Physical Therapy Daily Treatment Note  Date:  2022    Patient Name:  Jesus Yi    :  1950  MRN: [id number]  Medical/Treatment Diagnosis Information:  · Diagnosis: R42 (ICD-10-CM) - Dizziness  · Treatment Diagnosis: BPPV, righ ear unilateral vestibular hypofunction, & imbalance  Insurance/Certification information:  PT Insurance Information: [place] med[employer] essential HMO  Physician Information:  Elisabeth Rebollar MD   Plan of care signed (Y/N): []  Yes [x]  No     Date of Patient follow up with Physician:  D[place]     Progress Report: []  Yes  [x]  No     Date Range for reporting period:  Beginning:   Ending:     Progress report due (10 Rx/or 30 days whichever is less): visit #80 or   Recertification due (POC duration/ or 90 days whichever is less): visit #20    Visit # Insurance Allowable Auth required? Date Range   eval   6 visits  [x]  Yes  []  No -2022         Latex Allergy:  [x]NO      []YES  Preferred Language for Healthcare:   [x]English       []other:    Functional Scale:           Date assessed:  TO physical FS primary measure score = 56; risk adjusted = 53  2022    Pain level:  2/10 dizziness     SUBJECTIVE:  The patient reports if he focusing on a moving object this may make him dizzy. He denies motion sensitivity when driving. He reports the room hasn't been spinning when lying down. He notices symptoms if he gets up or moves too quickly. He sees cardiologist next Tuesday to see if he needs additional procedure.     OBJECTIVE:     - small saccades noted when stabilizing during saccade activities, some diff keeping head still with smooth pursuits    RESTRICTIONS/PRECAUTIONS: Cardiac valve replacement, HTN    Exercises/Interventions:     Therapeutic Exercises (93585) Resistance / level Sets/sec Reps Notes Therapeutic Activities (38460)                                          Neuromuscular Re-ed (93758)       5/16:   Loaded silver medardo dobbinse (+) R  R CRT performed x 1    5/16 - fatiguing upbeating torsional nystagmus, <15sec  5/12 upbeat torsion 7s 1st time, no nystagmus 2 CRT   4/21 upbeat torsional nystagmus 2 sec x1 CRT, 2 CRT no nystagmus    VOR  2ft & 6ft standing  120 bpm 1 min  5/16 slight dizziness; 120bpm   Saccade  V/H  Smooth Pursuit   V/H Standing  ''  15x ea  10x 5/16 - ^ reps  5/16 - slight symptoms during, but subsides at cessation   Turns/twist  2 10 5/16 - added set; w/ number finding   Quarter turns    3 R/L  5/16 - added rep; some difficulty with sequence as continues   Balance:  Rhomberg airex  Rhomberg EC airex  Staggered airex  Tandem  Tandem EC       20''  20'' x 2  20''  20''  20'' 5/16 added   Manual Intervention (18749)       CRT R Epley   1x 5/16                                        Modalities:     Pt. Education:  -patient educated on diagnosis, prognosis and expectations for rehab  -all patient questions were answered    Home Exercise Program:  5/16 - encouraged VOR 3 times daily, V/H  4/21/2022: VOR handout [facility]     Therapeutic Exercise and NMR EXR  [] ([id number]) Provided verbal/tactile cueing for activities related to strengthening, flexibility, endurance, ROM for improvements in  [] LE / Lumbar: LE, proximal hip, and core control with self care, mobility, lifting, ambulation. [] UE / Cervical: cervical, postural, scapular, scapulothoracic and UE control with self care, reaching, carrying, lifting, house/yardwork, driving, computer work. [x] (13204) Provided verbal/tactile cueing for activities related to improving balance, coordination, kinesthetic sense, posture, motor skill, proprioception to assist with   [] LE / lumbar: LE, proximal hip, and core control in self care, mobility, lifting, ambulation and eccentric single leg control.    [x] UE / cervical: cervical, scapular, scapulothoracic and UE control with self care, reaching, carrying, lifting, house/yardwork, driving, computer work.   [] (97173) Therapist is in constant attendance of 2 or more patients providing skilled therapy interventions, but not providing any significant amount of measurable one-on-one time to either patient, for improvements in  [] LE / lumbar: LE, proximal hip, and core control in self care, mobility, lifting, ambulation and eccentric single leg control. [] UE / cervical: cervical, scapular, scapulothoracic and UE control with self care, reaching, carrying, lifting, house/yardwork, driving, computer work. NMR and Therapeutic Activities:    [x] (74245 or 14711) Provided verbal/tactile cueing for activities related to improving balance, coordination, kinesthetic sense, posture, motor skill, proprioception and motor activation to allow for proper function of   [] LE: / Lumbar core, proximal hip and LE with self care and ADLs  [x] UE / Cervical: cervical, postural, scapular, scapulothoracic and UE control with self care, carrying, lifting, driving, computer work.   [] (41412) Gait Re-education- Provided training and instruction to the patient for proper LE, core and proximal hip recruitment and positioning and eccentric body weight control with ambulation re-education including up and down stairs     Home Management Training / Self Care:  [] (79197) Provided self-care/home management training related to activities of daily living and compensatory training, and/or use of adaptive equipment for improvement with: ADLs and compensatory training, meal preparation, safety procedures and instruction in use of adaptive equipment, including bathing, grooming, dressing, personal hygiene, basic household cleaning and chores.      Home Exercise Program:    [] (64755) Reviewed/Progressed HEP activities related to strengthening, flexibility, endurance, ROM of   [] LE / Lumbar: core, proximal hip and LE for functional self-care, mobility, lifting and ambulation/stair navigation   [] UE / Cervical: cervical, postural, scapular, scapulothoracic and UE control with self care, reaching, carrying, lifting, house/yardwork, driving, computer work  [x] (31436)Reviewed/Progressed HEP activities related to improving balance, coordination, kinesthetic sense, posture, motor skill, proprioception of   [] LE: core, proximal hip and LE for self care, mobility, lifting, and ambulation/stair navigation    [x] UE / Cervical: cervical, postural,  scapular, scapulothoracic and UE control with self care, reaching, carrying, lifting, house/yardwork, driving, computer work    Manual Treatments:  PROM / STM / Oscillations-Mobs:  G-I, II, III, IV (PA's, Inf., Post.)  [] (24755) Provided manual therapy to mobilize LE, proximal hip and/or LS spine soft tissue/joints for the purpose of modulating pain, promoting relaxation,  increasing ROM, reducing/eliminating soft tissue swelling/inflammation/restriction, improving soft tissue extensibility and allowing for proper ROM for normal function with   [] LE / lumbar: self care, mobility, lifting and ambulation. [] UE / Cervical: self care, reaching, carrying, lifting, house/yardwork, driving, computer work. Modalities:  [] (98763) Vasopneumatic compression: Utilized vasopneumatic compression to decrease edema / swelling for the purpose of improving mobility and quad tone / recruitment which will allow for increased overall function including but not limited to self-care, transfers, ambulation, and ascending / descending stairs.      Charges:  Timed Code Treatment Minutes: 40   Total Treatment Minutes: 40     [] EVAL - LOW (67286)   [] EVAL - MOD (02395)  [] EVAL - HIGH (74467)  [] RE-EVAL (81471)  [] JQ(77758) x       [] Ionto   [x] NMR (76448) x 2     [] Vaso  [] Manual (20436) x       [] Ultrasound  [] TA x        [] Mech Traction (26813)  [] Aquatic Therapy x      [] ES (un) (62223): [] Home Management Training x  [] ES(attended) (05822)   [] Dry Needling 1-2 muscles (64853):  [] Dry Needling 3+ muscles (838529)  [] Group:      [x] Other: CRT procedure x1    GOALS:  Patient stated goal: to improve dizziness   []? Progressing: []? Met: []? Not Met: []? Adjusted     Therapist goals for Patient:   Short Term Goals: To be achieved in: 2 weeks  1. Independent in HEP and progression per patient tolerance, in order to prevent re-injury. []? Progressing: []? Met: []? Not Met: []? Adjusted  2. Patient will have a decrease in dizziness/imbalance/symptoms with normal ADLs to facilitate improvement in movement, function, balance and ADLs as indicated by Functional Deficits. []? Progressing: []? Met: []? Not Met: []? Adjusted     Long Term Goals: To be achieved in: 4 weeks  1. FOTO score of 60  to assist with reaching prior level of function. []? Progressing: []? Met: []? Not Met: []? Adjusted  2. Patient will demonstrate negative oculomotor special testing/positional testing as indicated by patients Functional Deficits. []? Progressing: []? Met: []? Not Met: []? Adjusted  3. Patient will return to functional activities including lying down in bed without increased symptoms or restriction. []? Progressing: []? Met: []? Not Met: []? Adjusted  4. Patient to be able to make rapid head movements without dizziness/apprehension. []? Progressing: []? Met: []? Not Met: []? Adjusted          Overall Progression Towards Functional goals/ Treatment Progress Update:  [] Patient is progressing as expected towards functional goals listed. [] Progression is slowed due to complexities/Impairments listed. [] Progression has been slowed due to co-morbidities.   [x] Plan just implemented, too soon to assess goals progression <30days   [] Goals require adjustment due to lack of progress  [] Patient is not progressing as expected and requires additional follow up with physician  [] Other    Persisting Functional Limitations/Impairments:  []Sleeping []Sitting               []Standing []Transfers        []Walking []Kneeling               []Stairs []Squatting / bending   []ADLs []Reaching  []Lifting  []Housework  []Driving []Job related tasks  []Sports/Recreation [x]Other: lying down / getting up       ASSESSMENT:   The patient had some difficulty with higher BPM VOR. He struggles with eyes closed balance on noncompliant surfaces. Positive R Efrem Hallpike indicated posterior canalithiasis, addressed with CRT x1 R. Will continue with habituation and gaze exercises to reduce hypofunction with vestibular system and restore normal balance for ADLs. Treatment/Activity Tolerance:  [x] Patient able to complete tx  [] Patient limited by fatigue  [] Patient limited by pain  [] Patient limited by other medical complications  [] Other:     Prognosis: [x] Good [] Fair  [] Poor    Patient Requires Follow-up: [x] Yes  [] No    Plan for next treatment session:    PLAN: See eval. PT 2x / week for 4 weeks. [x] Continue per plan of care [] Alter current plan (see comments)  [] Plan of care initiated [] Hold pending MD visit [] Discharge    Electronically signed by: Sera Ortega, PT PT, DPT, OMT-C    Note: If patient does not return for scheduled/ recommended follow up visits, this note will serve as a discharge from care along with most recent update on progress.

## 2022-05-18 ENCOUNTER — HOSPITAL ENCOUNTER (OUTPATIENT)
Dept: PHYSICAL THERAPY | Age: 72
Setting detail: THERAPIES SERIES
Discharge: HOME | End: 2022-05-18
Payer: MEDICARE

## 2022-05-18 PROCEDURE — 97112 NEUROMUSCULAR REEDUCATION: CPT

## 2022-05-18 NOTE — FLOWSHEET NOTE
[address] [facility]  Phone: [phone]   Fax: [phone]    Physical Therapy Daily Treatment Note  Date:  2022    Patient Name:  Lisbeth Thomas    :  1950  MRN: [id number]  Medical/Treatment Diagnosis Information:  · Diagnosis: R42 (ICD-10-CM) - Dizziness  · Treatment Diagnosis: BPPV, righ ear unilateral vestibular hypofunction, & imbalance  Insurance/Certification information:  PT Insurance Information: [place] mediblue essential HMO  Physician Information:  Leyda Morillo MD   Plan of care signed (Y/N): []  Yes [x]  No     Date of Patient follow up with Physician:  TBPONCHO     Progress Report: []  Yes  [x]  No     Date Range for reporting period:  Beginning:   Ending:     Progress report due (10 Rx/or 30 days whichever is less): visit #66 or   Recertification due (POC duration/ or 90 days whichever is less): visit #20    Visit # Insurance Allowable Auth required? Date Range   eval   6 visits  [x]  Yes  []  No -2022         Latex Allergy:  [x]NO      []YES  Preferred Language for Healthcare:   [x]English       []other:    Functional Scale:           Date assessed:  TO physical FS primary measure score = 56; risk adjusted = 53  2022    Pain level:  2/10 dizziness     SUBJECTIVE:  The patient reports if he focusing on a moving object this may make him dizzy. He denies motion sensitivity when driving. He reports the room hasn't been spinning when lying down. He notices symptoms if he gets up or moves too quickly. He sees cardiologist next Tuesday to see if he needs additional procedure.     OBJECTIVE:     - small saccades noted when stabilizing during saccade activities, some diff keeping head still with smooth pursuits    RESTRICTIONS/PRECAUTIONS: Cardiac valve replacement, HTN    Exercises/Interventions:     Therapeutic Exercises (97661) Resistance / level Sets/sec Reps Notes   Nustep  1.0 4 min Therapeutic Activities (60041)                                          Neuromuscular Re-ed (06596)       5/16:   Loaded silver medardo roberts (+) R  R CRT performed x 1    5/16 - fatiguing upbeating torsional nystagmus, <15sec  5/12 upbeat torsion 7s 1st time, no nystagmus 2 CRT   4/21 upbeat torsional nystagmus 2 sec x1 CRT, 2 CRT no nystagmus    VOR  2ft & 6ft standing  120 bpm 1 min  5/16 slight dizziness; 120bpm   Saccade  V/H  Smooth Pursuit   V/H Standing  ''  15x ea  10x 5/16 - ^ reps  5/16 - slight symptoms during, but subsides at cessation   Turns/twist  2 10 5/16 - added set; w/ number finding   Quarter turns    3 R/L  5/16 - added rep; some difficulty with sequence as continues   Balance:  Rhomberg airex  Rhomberg EC airex  Staggered airex  Tandem  Tandem EC       20''  20'' x 2  20''  20''  20'' 5/18 swaying and mild LOB    Manual Intervention (12083)       CRT R Epley   1x 5/16                                        Modalities:     Pt. Education:  -patient educated on diagnosis, prognosis and expectations for rehab  -all patient questions were answered    Home Exercise Program:  5/16 - encouraged VOR 3 times daily, V/H  4/21/2022: VOR handout [facility]     Therapeutic Exercise and NMR EXR  [] ([id number]) Provided verbal/tactile cueing for activities related to strengthening, flexibility, endurance, ROM for improvements in  [] LE / Lumbar: LE, proximal hip, and core control with self care, mobility, lifting, ambulation. [] UE / Cervical: cervical, postural, scapular, scapulothoracic and UE control with self care, reaching, carrying, lifting, house/yardwork, driving, computer work.   [x] (21770) Provided verbal/tactile cueing for activities related to improving balance, coordination, kinesthetic sense, posture, motor skill, proprioception to assist with   [] LE / lumbar: LE, proximal hip, and core control in self care, mobility, lifting, ambulation and eccentric single leg control. [x] UE / cervical: cervical, scapular, scapulothoracic and UE control with self care, reaching, carrying, lifting, house/yardwork, driving, computer work.   [] (42879) Therapist is in constant attendance of 2 or more patients providing skilled therapy interventions, but not providing any significant amount of measurable one-on-one time to either patient, for improvements in  [] LE / lumbar: LE, proximal hip, and core control in self care, mobility, lifting, ambulation and eccentric single leg control. [] UE / cervical: cervical, scapular, scapulothoracic and UE control with self care, reaching, carrying, lifting, house/yardwork, driving, computer work. NMR and Therapeutic Activities:    [x] (99641 or 83881) Provided verbal/tactile cueing for activities related to improving balance, coordination, kinesthetic sense, posture, motor skill, proprioception and motor activation to allow for proper function of   [] LE: / Lumbar core, proximal hip and LE with self care and ADLs  [x] UE / Cervical: cervical, postural, scapular, scapulothoracic and UE control with self care, carrying, lifting, driving, computer work.   [] (53600) Gait Re-education- Provided training and instruction to the patient for proper LE, core and proximal hip recruitment and positioning and eccentric body weight control with ambulation re-education including up and down stairs     Home Management Training / Self Care:  [] (89383) Provided self-care/home management training related to activities of daily living and compensatory training, and/or use of adaptive equipment for improvement with: ADLs and compensatory training, meal preparation, safety procedures and instruction in use of adaptive equipment, including bathing, grooming, dressing, personal hygiene, basic household cleaning and chores.      Home Exercise Program:    [] (55300) Reviewed/Progressed HEP activities related to strengthening, flexibility, endurance, ROM of   [] LE / Lumbar: core, proximal hip and LE for functional self-care, mobility, lifting and ambulation/stair navigation   [] UE / Cervical: cervical, postural, scapular, scapulothoracic and UE control with self care, reaching, carrying, lifting, house/yardwork, driving, computer work  [x] (56313)Reviewed/Progressed HEP activities related to improving balance, coordination, kinesthetic sense, posture, motor skill, proprioception of   [] LE: core, proximal hip and LE for self care, mobility, lifting, and ambulation/stair navigation    [x] UE / Cervical: cervical, postural,  scapular, scapulothoracic and UE control with self care, reaching, carrying, lifting, house/yardwork, driving, computer work    Manual Treatments:  PROM / STM / Oscillations-Mobs:  G-I, II, III, IV (PA's, Inf., Post.)  [] (95567) Provided manual therapy to mobilize LE, proximal hip and/or LS spine soft tissue/joints for the purpose of modulating pain, promoting relaxation,  increasing ROM, reducing/eliminating soft tissue swelling/inflammation/restriction, improving soft tissue extensibility and allowing for proper ROM for normal function with   [] LE / lumbar: self care, mobility, lifting and ambulation. [] UE / Cervical: self care, reaching, carrying, lifting, house/yardwork, driving, computer work. Modalities:  [] (29937) Vasopneumatic compression: Utilized vasopneumatic compression to decrease edema / swelling for the purpose of improving mobility and quad tone / recruitment which will allow for increased overall function including but not limited to self-care, transfers, ambulation, and ascending / descending stairs.      Charges:  Timed Code Treatment Minutes: 39   Total Treatment Minutes: 39     [] EVAL - LOW (44053)   [] EVAL - MOD (93111)  [] EVAL - HIGH (14036)  [] RE-EVAL (80027)  [] IO(43408) x       [] Ionto   [x] NMR (01059) x 3     [] Vaso  [] Manual (94398) x       [] Ultrasound  [] TA x        [] Mech Traction (69979)  [] Aquatic Therapy x      [] ES (un) (84626):   [] Home Management Training x  [] ES(attended) (67634)   [] Dry Needling 1-2 muscles (33587):  [] Dry Needling 3+ muscles (521640)  [] Group:      [x] Other: CRT procedure x    GOALS:  Patient stated goal: to improve dizziness   []? Progressing: []? Met: []? Not Met: []? Adjusted     Therapist goals for Patient:   Short Term Goals: To be achieved in: 2 weeks  1. Independent in HEP and progression per patient tolerance, in order to prevent re-injury. []? Progressing: []? Met: []? Not Met: []? Adjusted  2. Patient will have a decrease in dizziness/imbalance/symptoms with normal ADLs to facilitate improvement in movement, function, balance and ADLs as indicated by Functional Deficits. []? Progressing: []? Met: []? Not Met: []? Adjusted     Long Term Goals: To be achieved in: 4 weeks  1. FOTO score of 60  to assist with reaching prior level of function. []? Progressing: []? Met: []? Not Met: []? Adjusted  2. Patient will demonstrate negative oculomotor special testing/positional testing as indicated by patients Functional Deficits. []? Progressing: []? Met: []? Not Met: []? Adjusted  3. Patient will return to functional activities including lying down in bed without increased symptoms or restriction. []? Progressing: []? Met: []? Not Met: []? Adjusted  4. Patient to be able to make rapid head movements without dizziness/apprehension. []? Progressing: []? Met: []? Not Met: []? Adjusted          Overall Progression Towards Functional goals/ Treatment Progress Update:  [] Patient is progressing as expected towards functional goals listed. [] Progression is slowed due to complexities/Impairments listed. [] Progression has been slowed due to co-morbidities.   [x] Plan just implemented, too soon to assess goals progression <30days   [] Goals require adjustment due to lack of progress  [] Patient is not progressing as expected and requires additional follow up with physician  [] Other    Persisting Functional Limitations/Impairments:  []Sleeping []Sitting               []Standing []Transfers        []Walking []Kneeling               []Stairs []Squatting / bending   []ADLs []Reaching  []Lifting  []Housework  []Driving []Job related tasks  []Sports/Recreation [x]Other: lying down / getting up       ASSESSMENT:  The patient demonstrated LOB with tandem/ narrowed base support on compliant and noncompliant surface due to feeling off today coming in.  R efrem batres pike was negative this date . He continue with habituation and gaze stabilization activities without dizziness. The patient to fruther benefit from vestibular rehab to restore normalization of balance/positiion sense. The patient had some difficulty with higher BPM VOR. He struggles with eyes closed balance on noncompliant surfaces. Positive R Efrem Hallpike indicated posterior canalithiasis, addressed with CRT x1 R. Will continue with habituation and gaze exercises to reduce hypofunction with vestibular system and restore normal balance for ADLs. Treatment/Activity Tolerance:  [x] Patient able to complete tx  [] Patient limited by fatigue  [] Patient limited by pain  [] Patient limited by other medical complications  [] Other:     Prognosis: [x] Good [] Fair  [] Poor    Patient Requires Follow-up: [x] Yes  [] No    Plan for next treatment session:    PLAN: See sonal PT 2x / week for 4 weeks. [x] Continue per plan of care [] Alter current plan (see comments)  [] Plan of care initiated [] Hold pending MD visit [] Discharge    Electronically signed by: Elaine Cano, PT PT, DPT, OMT-C    Note: If patient does not return for scheduled/ recommended follow up visits, this note will serve as a discharge from care along with most recent update on progress.

## 2022-05-23 ENCOUNTER — HOSPITAL ENCOUNTER (OUTPATIENT)
Dept: PHYSICAL THERAPY | Age: 72
Setting detail: THERAPIES SERIES
Discharge: HOME | End: 2022-05-23
Payer: MEDICARE

## 2022-05-23 PROCEDURE — 97530 THERAPEUTIC ACTIVITIES: CPT

## 2022-05-23 PROCEDURE — 97112 NEUROMUSCULAR REEDUCATION: CPT

## 2022-05-23 PROCEDURE — 95992 CANALITH REPOSITIONING PROC: CPT

## 2022-05-23 NOTE — FLOWSHEET NOTE
[address] [facility] Physical Therapy  Phone: [phone]   Fax: [phone]    Physical Therapy Daily Treatment Note  Date:  2022    Patient Name:  Ady Donato    :  1950  MRN: [id number]  Medical/Treatment Diagnosis Information:  · Diagnosis: R42 (ICD-10-CM) - Dizziness  · Treatment Diagnosis: BPPV, righ ear unilateral vestibular hypofunction, & imbalance  Insurance/Certification information:  PT Insurance Information: [place] [employer] essential HMO  Physician Information:  Destini Gutierrez MD   Plan of care signed (Y/N): [x]  Yes []  No     Date of Patient follow up with Physician:  [place]     Progress Report: []  Yes  [x]  No     Date Range for reporting period:  Beginning:   Ending:     Progress report due (10 Rx/or 30 days whichever is less): visit #89 or   Recertification due (POC duration/ or 90 days whichever is less): visit #20    Visit # Insurance Allowable Auth required? Date Range   eval   6 visits  [x]  Yes  []  No -2022         Latex Allergy:  [x]NO      []YES  Preferred Language for Healthcare:   [x]English       []other:    Functional Scale:           Date assessed:  TO physical FS primary measure score = 56; risk adjusted = 53  2022    Pain level:  0/10 dizziness     SUBJECTIVE:  The patient denies dizziness all weekend. He reports PT has helped tremendously. He hasn't noticed dizziness getting out of bed either. He sees cardiologist tomorrow. OBJECTIVE:     - CALDWELL = 54; Head Thrust (negative R/L);  Efrem-Hallpike negative L; positive R for canalithiasis of posterior canal; TUG = 9sec    - small saccades noted when stabilizing during saccade activities, some diff keeping head still with smooth pursuits    RESTRICTIONS/PRECAUTIONS: Cardiac valve replacement, HTN    Exercises/Interventions:     Therapeutic Exercises (43140) Resistance / level Sets/sec Reps Notes   Nustep Therapeutic Activities (98111)       Retro Foam Step Up  5 5'' R/L, ea    VOR + Gait (H)  70ft 4x    Walking w/ Head Turns/Pitches  70ft 2x ea                  Neuromuscular Re-ed (15357)       5/16:   Loaded silver batres pike (+) R  R CRT performed x 1   VOR  2ft & 6ft standing  Saccade  V/H  Smooth Pursuit   V/H Turns/twist  1 10 5/16 - added set; w/ number finding   Quarter turns w/ head tilted into extension   3 R/L  5/16 - added rep; some difficulty with sequence as continues   Balance:  Rhomberg airex  Rhomberg EC airex  Staggered airex  Tandem airex  Tandem EC         20'' x 2    2 x 15'' R/L   5/23 - mild sway   Manual Intervention (93483)       CRT R Epley   1x 5/23 -                                         Modalities:     Pt. Education:  -patient educated on diagnosis, prognosis and expectations for rehab  -all patient questions were answered    Home Exercise Program:  5/16 - encouraged VOR 3 times daily, V/H  4/21/2022: VOR handout [facility]     Therapeutic Exercise and NMR EXR  [] ([id number]) Provided verbal/tactile cueing for activities related to strengthening, flexibility, endurance, ROM for improvements in  [] LE / Lumbar: LE, proximal hip, and core control with self care, mobility, lifting, ambulation. [] UE / Cervical: cervical, postural, scapular, scapulothoracic and UE control with self care, reaching, carrying, lifting, house/yardwork, driving, computer work. [x] (66820) Provided verbal/tactile cueing for activities related to improving balance, coordination, kinesthetic sense, posture, motor skill, proprioception to assist with   [] LE / lumbar: LE, proximal hip, and core control in self care, mobility, lifting, ambulation and eccentric single leg control.    [x] UE / cervical: cervical, scapular, scapulothoracic and UE control with self care, reaching, carrying, lifting, house/yardwork, driving, computer work.   [] (95321) Therapist is in constant attendance of 2 or more patients providing skilled therapy interventions, but not providing any significant amount of measurable one-on-one time to either patient, for improvements in  [] LE / lumbar: LE, proximal hip, and core control in self care, mobility, lifting, ambulation and eccentric single leg control. [] UE / cervical: cervical, scapular, scapulothoracic and UE control with self care, reaching, carrying, lifting, house/yardwork, driving, computer work. NMR and Therapeutic Activities:    [x] (80117 or 16896) Provided verbal/tactile cueing for activities related to improving balance, coordination, kinesthetic sense, posture, motor skill, proprioception and motor activation to allow for proper function of   [] LE: / Lumbar core, proximal hip and LE with self care and ADLs  [x] UE / Cervical: cervical, postural, scapular, scapulothoracic and UE control with self care, carrying, lifting, driving, computer work.   [] (90949) Gait Re-education- Provided training and instruction to the patient for proper LE, core and proximal hip recruitment and positioning and eccentric body weight control with ambulation re-education including up and down stairs     Home Management Training / Self Care:  [] (06851) Provided self-care/home management training related to activities of daily living and compensatory training, and/or use of adaptive equipment for improvement with: ADLs and compensatory training, meal preparation, safety procedures and instruction in use of adaptive equipment, including bathing, grooming, dressing, personal hygiene, basic household cleaning and chores.      Home Exercise Program:    [] (40050) Reviewed/Progressed HEP activities related to strengthening, flexibility, endurance, ROM of   [] LE / Lumbar: core, proximal hip and LE for functional self-care, mobility, lifting and ambulation/stair navigation   [] UE / Cervical: cervical, postural, scapular, scapulothoracic and UE control with self care, reaching, carrying, lifting, house/yardwork, driving, computer work  [] (63110)Reviewed/Progressed HEP activities related to improving balance, coordination, kinesthetic sense, posture, motor skill, proprioception of   [] LE: core, proximal hip and LE for self care, mobility, lifting, and ambulation/stair navigation    [] UE / Cervical: cervical, postural,  scapular, scapulothoracic and UE control with self care, reaching, carrying, lifting, house/yardwork, driving, computer work    Manual Treatments:  PROM / STM / Oscillations-Mobs:  G-I, II, III, IV (PA's, Inf., Post.)  [] ([id number]) Provided manual therapy to mobilize LE, proximal hip and/or LS spine soft tissue/joints for the purpose of modulating pain, promoting relaxation,  increasing ROM, reducing/eliminating soft tissue swelling/inflammation/restriction, improving soft tissue extensibility and allowing for proper ROM for normal function with   [] LE / lumbar: self care, mobility, lifting and ambulation. [] UE / Cervical: self care, reaching, carrying, lifting, house/yardwork, driving, computer work. Modalities:  [] (44097) Vasopneumatic compression: Utilized vasopneumatic compression to decrease edema / swelling for the purpose of improving mobility and quad tone / recruitment which will allow for increased overall function including but not limited to self-care, transfers, ambulation, and ascending / descending stairs.      Charges:  Timed Code Treatment Minutes: 38   Total Treatment Minutes: 38     [] EVAL - LOW (93488)   [] EVAL - MOD (25090)  [] EVAL - HIGH (61115)  [] RE-EVAL (45327)  [] QD(06579) x       [] Ionto   [x] NMR (33883) x 1     [] Vaso  [] Manual (23262) x       [] Ultrasound  [x] TA x 1       [] Mech Traction (00124)  [] Aquatic Therapy x      [] ES (un) (52895):   [] Home Management Training x  [] ES(attended) (21013)   [] Dry Needling 1-2 muscles (66875):  [] Dry Needling 3+ muscles (220981)  [] Group:      [x] Other: CRT procedure x1    GOALS:  Patient stated goal: to improve dizziness   [x]? Progressing: []? Met: []? Not Met: []? Adjusted     Therapist goals for Patient:   Short Term Goals: To be achieved in: 2 weeks  1. Independent in HEP and progression per patient tolerance, in order to prevent re-injury. []? Progressing: [x]? Met: []? Not Met: []? Adjusted  2. Patient will have a decrease in dizziness/imbalance/symptoms with normal ADLs to facilitate improvement in movement, function, balance and ADLs as indicated by Functional Deficits. []? Progressing: [x]? Met: []? Not Met: []? Adjusted     Long Term Goals: To be achieved in: 4 weeks  1. FOTO score of 60  to assist with reaching prior level of function. [x]? Progressing: []? Met: []? Not Met: []? Adjusted  2. Patient will demonstrate negative oculomotor special testing/positional testing as indicated by patients Functional Deficits. [x]? Progressing: []? Met: []? Not Met: []? Adjusted  3. Patient will return to functional activities including lying down in bed without increased symptoms or restriction. []? Progressing: [x]? Met: []? Not Met: []? Adjusted  4. Patient to be able to make rapid head movements without dizziness/apprehension. [x]? Progressing: []? Met: []? Not Met: []? Adjusted          Overall Progression Towards Functional goals/ Treatment Progress Update:  [x] Patient is progressing as expected towards functional goals listed. [] Progression is slowed due to complexities/Impairments listed. [] Progression has been slowed due to co-morbidities.   [] Plan just implemented, too soon to assess goals progression <30days   [] Goals require adjustment due to lack of progress  [] Patient is not progressing as expected and requires additional follow up with physician  [] Other    Persisting Functional Limitations/Impairments:  []Sleeping []Sitting               []Standing []Transfers        []Walking []Kneeling               []Stairs []Squatting / bending   []ADLs []Reaching  []Lifting []Housework  []Driving []Job related tasks  []Sports/Recreation [x]Other: lying down / getting up       ASSESSMENT:  The patient demos positive R PC Canalithiasis this date, which was reduced by R Epley maneuver. The patient continues to improve in oculomotor function, balance, and overall performance of ADLs/iADLs. The patient may taper POC to 1x/week pending symptoms next visit. Treatment/Activity Tolerance:  [x] Patient able to complete tx  [] Patient limited by fatigue  [] Patient limited by pain  [] Patient limited by other medical complications  [] Other:     Prognosis: [x] Good [] Fair  [] Poor    Patient Requires Follow-up: [x] Yes  [] No    Plan for next treatment session:    PLAN: See eval. PT 2x / week for 4 weeks. [x] Continue per plan of care [] Alter current plan (see comments)  [] Plan of care initiated [] Hold pending MD visit [] Discharge    Electronically signed by: Wen Edmonds, PT PT, DPT, OMT-C    Note: If patient does not return for scheduled/ recommended follow up visits, this note will serve as a discharge from care along with most recent update on progress.

## 2022-05-25 ENCOUNTER — HOSPITAL ENCOUNTER (OUTPATIENT)
Dept: PHYSICAL THERAPY | Age: 72
Setting detail: THERAPIES SERIES
Discharge: HOME | End: 2022-05-25
Payer: MEDICARE

## 2022-05-25 PROCEDURE — 97530 THERAPEUTIC ACTIVITIES: CPT

## 2022-05-25 NOTE — DISCHARGE SUMMARY
[facility]  Phone: [phone]   Fax: [phone]   Physical Therapy Discharge Summary    Dear   Yahir Shen MD ,    We had the pleasure of treating the following patient for physical therapy services at [facility]. A summary of our findings can be found in the discharge summary below. If you have any questions or concerns regarding these findings, please do not hesitate to contact me at the office phone number checked above. Thank you for the referral.     Physician Signature:________________________________Date:__________________  By signing above (or electronic signature), therapists plan is approved by physician    Functional Outcome: FOTO physical FS primary measure score = 64  5/25/2022    Balance:  Rhomberg = 15sec  Rhomberg EC = 15sec  Rhomberg Airex = 15sec  Rhomberg Airex EC = 15sec  SLS L = 3 sec  SLS R = 15sec  Tandem R/L = 15sec    Head Thrust + L  VOR (negative) V/H  [place] Hallpike R/L = negative    Overall Response to Treatment:   [x]Patient is responding well to treatment and improvement is noted with regards to goals and will DC with iHEP of vestibular hypofunction and habituation exercises. Positional vertigo has resolved. The patient was instructed to follow up with MD or PT in case of new or unexpected symptoms. []Patient should continue to improve in reasonable time if they continue HEP   []Patient has plateaued and is no longer responding to skilled PT intervention    []Patient is getting worse and would benefit from return to referring MD   []Patient unable to adhere to initial POC   []Other:     GOALS:  Patient stated goal: to improve dizziness   []? Progressing: [x]? Met: []? Not Met: []? Adjusted     Therapist goals for Patient:   Short Term Goals: To be achieved in: 2 weeks  1. Independent in HEP and progression per patient tolerance, in order to prevent re-injury.    []? Progressing: [x]? Met: []? Not Met: []? Adjusted  2. Patient will have a decrease in dizziness/imbalance/symptoms with normal ADLs to facilitate improvement in movement, function, balance and ADLs as indicated by Functional Deficits. []? Progressing: [x]? Met: []? Not Met: []? Adjusted     Long Term Goals: To be achieved in: 4 weeks  1. FOTO score of 60  to assist with reaching prior level of function. []? Progressing: [x]? Met: []? Not Met: []? Adjusted  2. Patient will demonstrate negative oculomotor special testing/positional testing as indicated by patients Functional Deficits. []? Progressing: [x]? Met: []? Not Met: []? Adjusted  3. Patient will return to functional activities including lying down in bed without increased symptoms or restriction. []? Progressing: [x]? Met: []? Not Met: []? Adjusted  4. Patient to be able to make rapid head movements without dizziness/apprehension. []? Progressing: [x]? Met: []? Not Met: []? Adjusted          Date range of Visits: 22 - 22  Total Visits: 6    Recommendation:    [x] Discharge to [facility].  Follow up with PT or MD PRN, especially in the case of dizziness or positional vertigo          Physical Therapy Daily Treatment Note  Date:  2022    Patient Name:  Kaylee Jacobo    :  1950  MRN: [id number]  Medical/Treatment Diagnosis Information:  · Diagnosis: R42 (ICD-10-CM) - Dizziness  · Treatment Diagnosis: BPPV, righ ear unilateral vestibular hypofunction, & imbalance  Insurance/Certification information:  PT Insurance Information: [employer] essential [employer]  Physician Information:  Zay Copeland MD   Plan of care signed (Y/N): [x]  Yes []  No     Date of Patient follow up with Physician:  D[place]     Progress Report: [x]  Yes  []  No     Date Range for reporting period:  Beginnin22  Endin22    Progress report due (10 Rx/or 30 days whichever is less): visit #28 or   Recertification due (POC duration/ or 90 days whichever is less): visit #7/21/20    Visit # Insurance Allowable Auth required? Date Range   eval  5/6 6 visits  [x]  Yes  []  No 4/25-6/23/2022     Latex Allergy:  [x]NO      []YES  Preferred Language for Healthcare:   [x]English       []other:    Functional Scale:           Date assessed:  FOTO physical FS primary measure score = 56; risk adjusted = 53  4/21/2022  FOTO physical FS primary measure score = 64     5/25/2022    Pain level:  0/10 dizziness     SUBJECTIVE:  SEE DC    OBJECTIVE:    5/25 - SEE DC ABOVE  5/23 - CALDWELL = 54; Head Thrust (negative R/L);  [place]-Hallpike negative L; positive R for canalithiasis of posterior canal; TUG = 9sec   5/16 - small saccades noted when stabilizing during saccade activities, some diff keeping head still with smooth pursuits    RESTRICTIONS/PRECAUTIONS: Cardiac valve replacement, HTN    Exercises/Interventions:     Therapeutic Exercises (21227) Resistance / level Sets/sec Reps Notes   Nustep                                                              Therapeutic Activities (53961)       Retro Foam Step Up     VOR + Gait (H/V)  70ft 2x ea    Walking w/ Head Turns/Pitches  70ft 2x ea    Retro:  VOR + Gait (V/H)  Walking w/ Head Turns/Pitches      25ft  25ft   2x ea  2x ea 5/25 - added   The patient was provided a final assessment including evaluative tests and measures, as well as discharge documentation to track progress     19' 5/25   Standing Folding (cone ):  EO/EC     5x ea 5/25 - no dizziness          Neuromuscular Re-ed (60032)       5/16:   Loaded silver batres pike (+) R  R CRT performed x 1   VOR  2ft & 6ft standing  Saccade  V/H  Smooth Pursuit   V/H Turns/twist  Quarter turns w/ head tilted into extension  Balance:  Rhomberg airex  Rhomberg EC airex  Staggered airex  Tandem airex  Tandem EC    Manual Intervention (16747)       CRT R Epley                                        Modalities:     Pt. Education:  -patient educated on diagnosis, prognosis and expectations for rehab  -all patient questions were answered    Home Exercise Program:  5/25 - (re)encouraged VOR 3 times daily, V/H  5/16 - encouraged VOR 3 times daily, V/H  4/21/2022: VOR handout [facility]     Therapeutic Exercise and NMR EXR  [] (25010) Provided verbal/tactile cueing for activities related to strengthening, flexibility, endurance, ROM for improvements in  [] LE / Lumbar: LE, proximal hip, and core control with self care, mobility, lifting, ambulation. [] UE / Cervical: cervical, postural, scapular, scapulothoracic and UE control with self care, reaching, carrying, lifting, house/yardwork, driving, computer work.  [] (09613) Provided verbal/tactile cueing for activities related to improving balance, coordination, kinesthetic sense, posture, motor skill, proprioception to assist with   [] LE / lumbar: LE, proximal hip, and core control in self care, mobility, lifting, ambulation and eccentric single leg control. [] UE / cervical: cervical, scapular, scapulothoracic and UE control with self care, reaching, carrying, lifting, house/yardwork, driving, computer work.   [] (13388) Therapist is in constant attendance of 2 or more patients providing skilled therapy interventions, but not providing any significant amount of measurable one-on-one time to either patient, for improvements in  [] LE / lumbar: LE, proximal hip, and core control in self care, mobility, lifting, ambulation and eccentric single leg control. [] UE / cervical: cervical, scapular, scapulothoracic and UE control with self care, reaching, carrying, lifting, house/yardwork, driving, computer work.      NMR and Therapeutic Activities:    [x] (87141 or 45137) Provided verbal/tactile cueing for activities related to improving balance, coordination, kinesthetic sense, posture, motor skill, proprioception and motor activation to allow for proper function of   [] LE: / Lumbar core, proximal hip and LE with self care and ADLs  [x] UE / Cervical: cervical, postural, scapular, scapulothoracic and UE control with self care, carrying, lifting, driving, computer work.   [] (70071) Gait Re-education- Provided training and instruction to the patient for proper LE, core and proximal hip recruitment and positioning and eccentric body weight control with ambulation re-education including up and down stairs     Home Management Training / Self Care:  [] (19495) Provided self-care/home management training related to activities of daily living and compensatory training, and/or use of adaptive equipment for improvement with: ADLs and compensatory training, meal preparation, safety procedures and instruction in use of adaptive equipment, including bathing, grooming, dressing, personal hygiene, basic household cleaning and chores.      Home Exercise Program:    [] (15436) Reviewed/Progressed HEP activities related to strengthening, flexibility, endurance, ROM of   [] LE / Lumbar: core, proximal hip and LE for functional self-care, mobility, lifting and ambulation/stair navigation   [] UE / Cervical: cervical, postural, scapular, scapulothoracic and UE control with self care, reaching, carrying, lifting, house/yardwork, driving, computer work  [] (91479)Reviewed/Progressed HEP activities related to improving balance, coordination, kinesthetic sense, posture, motor skill, proprioception of   [] LE: core, proximal hip and LE for self care, mobility, lifting, and ambulation/stair navigation    [] UE / Cervical: cervical, postural,  scapular, scapulothoracic and UE control with self care, reaching, carrying, lifting, house/yardwork, driving, computer work    Manual Treatments:  PROM / STM / Oscillations-Mobs:  G-I, II, III, IV (PA's, Inf., Post.)  [] (73633) Provided manual therapy to mobilize LE, proximal hip and/or LS spine soft tissue/joints for the purpose of modulating pain, promoting relaxation,  increasing ROM, reducing/eliminating soft tissue swelling/inflammation/restriction, improving soft tissue extensibility and allowing for proper ROM for normal function with   [] LE / lumbar: self care, mobility, lifting and ambulation. [] UE / Cervical: self care, reaching, carrying, lifting, house/yardwork, driving, computer work. Modalities:  [] (63852) Vasopneumatic compression: Utilized vasopneumatic compression to decrease edema / swelling for the purpose of improving mobility and quad tone / recruitment which will allow for increased overall function including but not limited to self-care, transfers, ambulation, and ascending / descending stairs. Charges:  Timed Code Treatment Minutes: 33   Total Treatment Minutes: 33     [] EVAL - LOW (12658)   [] EVAL - MOD (83657)  [] EVAL - HIGH (17159)  [] RE-EVAL (36745)  [] HN(50576) x       [] Ionto   [] NMR (78447) x      [] Vaso  [] Manual (91658) x       [] Ultrasound  [x] TA x 2      [] Mech Traction (93993)  [] Aquatic Therapy x      [] ES (un) (06069):   [] Home Management Training x  [] ES(attended) (74777)   [] Dry Needling 1-2 muscles (16781):  [] Dry Needling 3+ muscles (002800)  [] Group:      [] Other:     GOALS:  Patient stated goal: to improve dizziness   []? Progressing: [x]? Met: []? Not Met: []? Adjusted     Therapist goals for Patient:   Short Term Goals: To be achieved in: 2 weeks  1. Independent in HEP and progression per patient tolerance, in order to prevent re-injury. []? Progressing: [x]? Met: []? Not Met: []? Adjusted  2. Patient will have a decrease in dizziness/imbalance/symptoms with normal ADLs to facilitate improvement in movement, function, balance and ADLs as indicated by Functional Deficits. []? Progressing: [x]? Met: []? Not Met: []? Adjusted     Long Term Goals: To be achieved in: 4 weeks  1. FOTO score of 60  to assist with reaching prior level of function. []? Progressing: [x]? Met: []? Not Met: []? Adjusted  2.  Patient will demonstrate negative oculomotor special testing/positional testing as indicated by patients Functional Deficits. []? Progressing: [x]? Met: []? Not Met: []? Adjusted  3. Patient will return to functional activities including lying down in bed without increased symptoms or restriction. []? Progressing: [x]? Met: []? Not Met: []? Adjusted  4. Patient to be able to make rapid head movements without dizziness/apprehension. []? Progressing: [x]? Met: []? Not Met: []? Adjusted          Overall Progression Towards Functional goals/ Treatment Progress Update:  [x] Patient is progressing as expected towards functional goals listed. [] Progression is slowed due to complexities/Impairments listed. [] Progression has been slowed due to co-morbidities. [] Plan just implemented, too soon to assess goals progression <30days   [] Goals require adjustment due to lack of progress  [] Patient is not progressing as expected and requires additional follow up with physician  [] Other    Persisting Functional Limitations/Impairments:  []Sleeping []Sitting               []Standing []Transfers        []Walking []Kneeling               []Stairs []Squatting / bending   []ADLs []Reaching  []Lifting  []Housework  []Driving []Job related tasks  []Sports/Recreation []Other: lying down / getting up       ASSESSMENT:  SEE DC     Treatment/Activity Tolerance:  [x] Patient able to complete tx  [] Patient limited by fatigue  [] Patient limited by pain  [] Patient limited by other medical complications  [] Other:     Prognosis: [x] Good [] Fair  [] Poor    Patient Requires Follow-up: [] Yes  [x] No    Plan for next treatment session: DC    PLAN:   [] Continue per plan of care [] Alter current plan (see comments)  [] Plan of care initiated [] Hold pending MD visit [x] Discharge    Electronically signed by: Precious Abad, PT PT, DPT, OMT-C    Therapist was present, directed the patient's care, made skilled judgement, and was responsible for assessment and treatment of the patient. JOHN Soto Ala    Note: If patient does not return for scheduled/ recommended follow up visits, this note will serve as a discharge from care along with most recent update on progress.

## 2022-05-31 ENCOUNTER — APPOINTMENT (OUTPATIENT)
Dept: PHYSICAL THERAPY | Age: 72
End: 2022-05-31
Payer: MEDICARE

## 2022-10-16 NOTE — PROGRESS NOTES
[facility]  Division of Audiology/Otolaryngology    1/27/2022     PCP: Omar Baer   MRN: [id number]     AUDIOLOGIC AND OTHER PERTINENT MEDICAL HISTORY:      Cintyha Palacio was seen for audiologic evaluation. Chad Josue MD is referral source of today's appointment. Patient presents with tinnitus is described as high-pitched and pulsatile, perceived as worse in right ear. Of note, he is being seen for vertigo of which seems to be positionally induced. Medical history is significant for renal impairment. ASSESSMENT AND FINDINGS:     Otoscopy revealed: Visible tympanic membrane bilaterally    RIGHT EAR:  Hearing Sensitivity: Normal-moderate mixed loss w/25 dB air-bone gap at 4 kHz. Word Recognition: Excellent (%), based on NU-6   Tympanometry:  Normal peak pressure with low compliance, Type As tympanogram, consistent with reduced tympanic membrane mobility. Acoustic Reflexes: Ipsilateral: Absent. LEFT EAR:  Hearing Sensitivity: Normal-moderate sensory loss   Word Recognition: Excellent (%), based on NU-6   Tympanometry: Could not maintain seal  Acoustic Reflexes: Ipsilateral: Could not maintain seal.             COUNSELING:        Reviewed purpose of testing completed today, general auditory system function, and results obtained today. Degree of   Hearing Sensitivity dB Range   Within Normal Limits (WNL) 0 - 20   Mild 20 - 40   Moderate 40 - 55   Moderately-Severe 55 - 70   Severe 70 - 90   Profound 90 +        RECOMMENDATIONS:          Chad Josue MD to medically advise.       Electronically signed by Juan Jacome on 1/27/22 at 8:44 AM.
DISCHARGE

## 2024-03-27 ENCOUNTER — HOSPITAL ENCOUNTER (EMERGENCY)
Age: 74
Discharge: HOME | End: 2024-03-27
Payer: MEDICARE

## 2024-03-27 VITALS
DIASTOLIC BLOOD PRESSURE: 70 MMHG | BODY MASS INDEX: 33.06 KG/M2 | HEIGHT: 67 IN | HEART RATE: 75 BPM | WEIGHT: 210.6 LBS | OXYGEN SATURATION: 97 % | TEMPERATURE: 98.1 F | RESPIRATION RATE: 16 BRPM | SYSTOLIC BLOOD PRESSURE: 149 MMHG

## 2024-03-27 DIAGNOSIS — S61.211A LACERATION OF LEFT INDEX FINGER WITHOUT FOREIGN BODY WITHOUT DAMAGE TO NAIL, INITIAL ENCOUNTER: Primary | ICD-10-CM

## 2024-03-27 DIAGNOSIS — Z79.01 ANTICOAGULATED ON COUMADIN: ICD-10-CM

## 2024-03-27 LAB
INR PPP: 2.53 (ref 0.84–1.16)
PROTHROMBIN TIME: 27.1 SEC (ref 11.5–14.8)

## 2024-03-27 PROCEDURE — 90714 TD VACC NO PRESV 7 YRS+ IM: CPT | Performed by: PHYSICIAN ASSISTANT

## 2024-03-27 PROCEDURE — 6360000002 HC RX W HCPCS: Performed by: PHYSICIAN ASSISTANT

## 2024-03-27 PROCEDURE — 90471 IMMUNIZATION ADMIN: CPT | Performed by: PHYSICIAN ASSISTANT

## 2024-03-27 PROCEDURE — 12001 RPR S/N/AX/GEN/TRNK 2.5CM/<: CPT

## 2024-03-27 PROCEDURE — 85610 PROTHROMBIN TIME: CPT

## 2024-03-27 PROCEDURE — 99284 EMERGENCY DEPT VISIT MOD MDM: CPT

## 2024-03-27 RX ORDER — ERYTHROMYCIN 5 MG/G
OINTMENT OPHTHALMIC EVERY 6 HOURS
Qty: 3.5 G | Refills: 1 | Status: SHIPPED | OUTPATIENT
Start: 2024-03-27 | End: 2024-03-27 | Stop reason: CLARIF

## 2024-03-27 RX ORDER — AMOXICILLIN AND CLAVULANATE POTASSIUM 875; 125 MG/1; MG/1
1 TABLET, FILM COATED ORAL 2 TIMES DAILY
Qty: 10 TABLET | Refills: 0 | Status: SHIPPED | OUTPATIENT
Start: 2024-03-27 | End: 2024-03-27 | Stop reason: CLARIF

## 2024-03-27 RX ORDER — CLINDAMYCIN HYDROCHLORIDE 300 MG/1
300 CAPSULE ORAL 3 TIMES DAILY
Qty: 15 CAPSULE | Refills: 0 | Status: SHIPPED | OUTPATIENT
Start: 2024-03-27 | End: 2024-03-27 | Stop reason: CLARIF

## 2024-03-27 RX ADMIN — CLOSTRIDIUM TETANI TOXOID ANTIGEN (FORMALDEHYDE INACTIVATED) AND CORYNEBACTERIUM DIPHTHERIAE TOXOID ANTIGEN (FORMALDEHYDE INACTIVATED) 0.5 ML: 5; 2 INJECTION, SUSPENSION INTRAMUSCULAR at 14:40

## 2024-03-27 ASSESSMENT — LIFESTYLE VARIABLES
HOW OFTEN DO YOU HAVE A DRINK CONTAINING ALCOHOL: MONTHLY OR LESS
HOW MANY STANDARD DRINKS CONTAINING ALCOHOL DO YOU HAVE ON A TYPICAL DAY: 1 OR 2

## 2024-03-27 ASSESSMENT — PAIN - FUNCTIONAL ASSESSMENT: PAIN_FUNCTIONAL_ASSESSMENT: NONE - DENIES PAIN

## 2024-03-27 NOTE — ED PROVIDER NOTES
[facility] EMERGENCY DEPARTMENT  EMERGENCY DEPARTMENT ENCOUNTER        Pt Name: Jethro Fitzpatrick  MRN: [id number]  Birthdate 1950  Date of evaluation: 3/27/2024  Provider: Nam Rubio PA-C  PCP: Lorenza Diallo  Note Started: 2:55 PM EDT 3/27/24      ROXI. I have evaluated this patient.        CHIEF COMPLAINT       Chief Complaint   Patient presents with    Laceration     Pt arrive w/ cc of finger laceration. Cut finger with vegetable fabio. Pt concerned because they are on Warfarin. Pt no longer actively bleeding at triage.         HISTORY OF PRESENT ILLNESS: 1 or more Elements     History From: patient  Limitations to history : None    Jethro Fitzpatrick is a 73 y.o. male who presents to the emergency department with a chief complaint of a laceration on his left index finger that he just sustained when he was cutting some potatoes.  He is right-hand dominant.  States he was concerned because he could not get this to stop bleeding and is anticoagulated on Coumadin with history of atrial fibrillation and valve replacement.  Denies numbness or any other injury.  Unsure of his last tetanus vaccination.  Denies foreign body sensation or decreased range of motion.    Nursing Notes were all reviewed and agreed with or any disagreements were addressed in the HPI.    REVIEW OF SYSTEMS :      Review of Systems    Positives and Pertinent negatives as per HPI.     SURGICAL HISTORY     Past Surgical History:   Procedure Laterality Date    AMPUTATION      left hand little finger partial amputation    CARDIAC SURGERY      valve replacement    CATARACT REMOVAL WITH IMPLANT      bilat    EYE SURGERY         CURRENTMEDICATIONS       Current Discharge Medication List        CONTINUE these medications which have NOT CHANGED    Details   predniSONE (DELTASONE) 2.5 MG tablet Take 7.5 mg by mouth daily.      clopidogrel (PLAVIX) 75 MG tablet Take 75 mg by mouth daily.      amLODIPine (NORVASC) 5 MG